# Patient Record
Sex: FEMALE | Race: WHITE | NOT HISPANIC OR LATINO | Employment: OTHER | URBAN - METROPOLITAN AREA
[De-identification: names, ages, dates, MRNs, and addresses within clinical notes are randomized per-mention and may not be internally consistent; named-entity substitution may affect disease eponyms.]

---

## 2019-10-07 ENCOUNTER — OFFICE VISIT (OUTPATIENT)
Dept: OBGYN CLINIC | Facility: CLINIC | Age: 79
End: 2019-10-07
Payer: MEDICARE

## 2019-10-07 ENCOUNTER — APPOINTMENT (OUTPATIENT)
Dept: RADIOLOGY | Facility: CLINIC | Age: 79
End: 2019-10-07
Payer: MEDICARE

## 2019-10-07 VITALS
HEIGHT: 66 IN | HEART RATE: 61 BPM | DIASTOLIC BLOOD PRESSURE: 76 MMHG | BODY MASS INDEX: 29.89 KG/M2 | SYSTOLIC BLOOD PRESSURE: 166 MMHG | WEIGHT: 186 LBS

## 2019-10-07 DIAGNOSIS — M18.12 ARTHRITIS OF CARPOMETACARPAL (CMC) JOINT OF LEFT THUMB: ICD-10-CM

## 2019-10-07 DIAGNOSIS — R20.2 LEFT HAND PARESTHESIA: ICD-10-CM

## 2019-10-07 DIAGNOSIS — M18.12 ARTHRITIS OF CARPOMETACARPAL (CMC) JOINT OF LEFT THUMB: Primary | ICD-10-CM

## 2019-10-07 PROCEDURE — 99203 OFFICE O/P NEW LOW 30 MIN: CPT | Performed by: ORTHOPAEDIC SURGERY

## 2019-10-07 PROCEDURE — 20600 DRAIN/INJ JOINT/BURSA W/O US: CPT | Performed by: ORTHOPAEDIC SURGERY

## 2019-10-07 PROCEDURE — 73140 X-RAY EXAM OF FINGER(S): CPT

## 2019-10-07 RX ORDER — TRIAMCINOLONE ACETONIDE 40 MG/ML
20 INJECTION, SUSPENSION INTRA-ARTICULAR; INTRAMUSCULAR
Status: COMPLETED | OUTPATIENT
Start: 2019-10-07 | End: 2019-10-07

## 2019-10-07 RX ORDER — HYDROCHLOROTHIAZIDE 12.5 MG/1
12.5 CAPSULE, GELATIN COATED ORAL DAILY
COMMUNITY

## 2019-10-07 RX ORDER — LIDOCAINE HYDROCHLORIDE 5 MG/ML
0.5 INJECTION, SOLUTION INFILTRATION; PERINEURAL
Status: COMPLETED | OUTPATIENT
Start: 2019-10-07 | End: 2019-10-07

## 2019-10-07 RX ADMIN — TRIAMCINOLONE ACETONIDE 20 MG: 40 INJECTION, SUSPENSION INTRA-ARTICULAR; INTRAMUSCULAR at 15:15

## 2019-10-07 RX ADMIN — LIDOCAINE HYDROCHLORIDE 0.5 ML: 5 INJECTION, SOLUTION INFILTRATION; PERINEURAL at 15:15

## 2019-10-07 NOTE — PROGRESS NOTES
Assessment/Plan:  1  Arthritis of carpometacarpal (CMC) joint of left thumb  XR thumb left first digit-min 2v    Small joint arthrocentesis: L thumb CMC    CANCELED: EMG 1 Limb   2  Left hand paresthesia  EMG 1 Limb       Scribe Attestation    I,:   Pastora Padilla MA am acting as a scribe while in the presence of the attending physician :        I,:   Daksha Foster DO personally performed the services described in this documentation    as scribed in my presence :              I discussed with Priya today that her signs and symptoms are consistent with left thumb CMC arthritis  She is tender to palpation over the [facility] joint  She does have signs consistent with left carpal tunnel well however all testing is negative on exam  An EMG was ordered for this today  Treatment options for left thumb CMC arthritis was discussed in the form of a steroid injection  She was agreeable to this and this was performed in the office today without any complications  She was fitted and provided with a cock-up wrist brace  She will follow in 3 months for repeat evaluation  Subjective:   Butch Chopra is a 78 y o  female who presents to the office today for evaluation of left hand pain She notes pain intermittently globally about her left hand  She states this has been ongoing for many years  Patient states she was evaluated by Dr Kisha Gill in the past and did undergo a CSI however, she does not recall what the CSI was for  She notes numbness and tingling to her entire hand  She states this is increased when she drives  Review of Systems   Constitutional: Negative for chills and fever  HENT: Negative for drooling and sneezing  Eyes: Negative for redness  Respiratory: Negative for cough and wheezing  Gastrointestinal: Negative for nausea and vomiting  Musculoskeletal: Positive for arthralgias  Negative for joint swelling and myalgias  Neurological: Negative for weakness and numbness     Psychiatric/Behavioral: Negative for behavioral problems  The patient is not nervous/anxious  Past Medical History:   Diagnosis Date    Hypertension        Past Surgical History:   Procedure Laterality Date    BLADDER SURGERY      CATARACT EXTRACTION Bilateral     RETINAL DETACHMENT SURGERY Bilateral        Family History   Problem Relation Age of Onset    No Known Problems Mother     No Known Problems Father     No Known Problems Sister     No Known Problems Brother     No Known Problems Maternal Aunt     No Known Problems Maternal Uncle     No Known Problems Paternal Aunt     No Known Problems Paternal Uncle     No Known Problems Maternal Grandmother     No Known Problems Maternal Grandfather     No Known Problems Paternal Grandmother     No Known Problems Paternal Grandfather        Social History     Occupational History    Not on file   Tobacco Use    Smoking status: Never Smoker    Smokeless tobacco: Never Used   Substance and Sexual Activity    Alcohol use: Never     Frequency: Never    Drug use: Never    Sexual activity: Not on file         Current Outpatient Medications:     hydrochlorothiazide (MICROZIDE) 12 5 mg capsule, Take 12 5 mg by mouth daily, Disp: , Rfl:     No Known Allergies    Objective:  Vitals:    10/07/19 1420   BP: 166/76   Pulse: 61       Ortho Exam     Left hand    - tinel's  - mike's  NTTP 1st dorsal compartment   Full fist  TTP thumb CMC  FDS FDP ext intact  Compartments soft  Brisk capillary refill  S/m intact median, radial, and ulnar nerve    Physical Exam   Constitutional: She is oriented to person, place, and time  She appears well-developed and well-nourished  HENT:   Head: Normocephalic and atraumatic  Eyes: Conjunctivae are normal  Right eye exhibits no discharge  Left eye exhibits no discharge  Neck: Normal range of motion  Neck supple  Cardiovascular: Normal rate and intact distal pulses  Pulmonary/Chest: Effort normal  No respiratory distress  Musculoskeletal:   As noted in HPI   Neurological: She is alert and oriented to person, place, and time  Skin: Skin is warm and dry  Psychiatric: She has a normal mood and affect  Her behavior is normal  Judgment and thought content normal      Small joint arthrocentesis: L thumb CMC  Date/Time: 10/7/2019 3:15 PM  Consent given by: patient  Site marked: site marked  Timeout: Immediately prior to procedure a time out was called to verify the correct patient, procedure, equipment, support staff and site/side marked as required   Supporting Documentation  Indications: joint swelling   Procedure Details  Location: thumb - L thumb CMC  Preparation: Patient was prepped and draped in the usual sterile fashion  Needle size: 27 G  Ultrasound guidance: no  Medications administered: 0 5 mL lidocaine 0 5 %; 20 mg triamcinolone acetonide 40 mg/mL    Patient tolerance: patient tolerated the procedure well with no immediate complications  Dressing:  Sterile dressing applied        I have personally reviewed pertinent films in PACS and my interpretation is as follows:X-ray left thumb performed in the office today demonstrates thumb CMC arthritis

## 2019-12-10 ENCOUNTER — APPOINTMENT (OUTPATIENT)
Dept: RADIOLOGY | Facility: CLINIC | Age: 79
End: 2019-12-10
Payer: MEDICARE

## 2019-12-10 ENCOUNTER — TRANSCRIBE ORDERS (OUTPATIENT)
Dept: RADIOLOGY | Facility: CLINIC | Age: 79
End: 2019-12-10

## 2019-12-10 DIAGNOSIS — M79.661 BILATERAL CALF PAIN: ICD-10-CM

## 2019-12-10 DIAGNOSIS — M79.662 BILATERAL CALF PAIN: Primary | ICD-10-CM

## 2019-12-10 DIAGNOSIS — M79.662 BILATERAL CALF PAIN: ICD-10-CM

## 2019-12-10 DIAGNOSIS — M79.661 BILATERAL CALF PAIN: Primary | ICD-10-CM

## 2019-12-10 PROCEDURE — 73590 X-RAY EXAM OF LOWER LEG: CPT

## 2020-01-24 ENCOUNTER — HOSPITAL ENCOUNTER (OUTPATIENT)
Dept: NEUROLOGY | Facility: HOSPITAL | Age: 80
Discharge: HOME | End: 2020-01-24
Attending: ORTHOPAEDIC SURGERY
Payer: MEDICARE

## 2020-01-24 DIAGNOSIS — R20.2 LEFT HAND PARESTHESIA: ICD-10-CM

## 2020-01-24 PROCEDURE — 95885 MUSC TST DONE W/NERV TST LIM: CPT | Performed by: PSYCHIATRY & NEUROLOGY

## 2020-01-24 PROCEDURE — 95909 NRV CNDJ TST 5-6 STUDIES: CPT | Performed by: PSYCHIATRY & NEUROLOGY

## 2020-01-30 ENCOUNTER — OFFICE VISIT (OUTPATIENT)
Dept: OBGYN CLINIC | Facility: CLINIC | Age: 80
End: 2020-01-30
Payer: MEDICARE

## 2020-01-30 VITALS — HEIGHT: 66 IN | BODY MASS INDEX: 29.89 KG/M2 | WEIGHT: 186 LBS

## 2020-01-30 DIAGNOSIS — M18.12 ARTHRITIS OF CARPOMETACARPAL (CMC) JOINT OF LEFT THUMB: Primary | ICD-10-CM

## 2020-01-30 DIAGNOSIS — G56.02 CARPAL TUNNEL SYNDROME ON LEFT: ICD-10-CM

## 2020-01-30 PROCEDURE — 99213 OFFICE O/P EST LOW 20 MIN: CPT | Performed by: ORTHOPAEDIC SURGERY

## 2020-01-30 PROCEDURE — 20600 DRAIN/INJ JOINT/BURSA W/O US: CPT | Performed by: ORTHOPAEDIC SURGERY

## 2020-01-30 RX ORDER — LIDOCAINE HYDROCHLORIDE 5 MG/ML
0.5 INJECTION, SOLUTION INFILTRATION; PERINEURAL
Status: COMPLETED | OUTPATIENT
Start: 2020-01-30 | End: 2020-01-30

## 2020-01-30 RX ORDER — TRIAMCINOLONE ACETONIDE 40 MG/ML
20 INJECTION, SUSPENSION INTRA-ARTICULAR; INTRAMUSCULAR
Status: COMPLETED | OUTPATIENT
Start: 2020-01-30 | End: 2020-01-30

## 2020-01-30 RX ADMIN — TRIAMCINOLONE ACETONIDE 20 MG: 40 INJECTION, SUSPENSION INTRA-ARTICULAR; INTRAMUSCULAR at 10:13

## 2020-01-30 RX ADMIN — LIDOCAINE HYDROCHLORIDE 0.5 ML: 5 INJECTION, SOLUTION INFILTRATION; PERINEURAL at 10:13

## 2020-01-30 NOTE — PROGRESS NOTES
Assessment/Plan:  1  Arthritis of carpometacarpal (CMC) joint of left thumb  Small joint arthrocentesis: L thumb CMC   2  Carpal tunnel syndrome on left         Scribe Attestation    I,:   Pastora Padilla MA am acting as a scribe while in the presence of the attending physician :        I,:   Olga Alvarado DO personally performed the services described in this documentation    as scribed in my presence :              The EMG results were discussed which does demonstrate left carpal tunnel with muscle loss  This is not bothersome for her at this time and we will continue to monitor this  Her main compliant is due to left thumb CMC arthritis  She is tender to palpation over the thumb CMC  Treatment options were discussed in the form of a repeat steroid injection  She was agreeable to this and this was performed in the office today without any complications  She will follow up in 3 months for repeat evaluation  Subjective:   Geraldine Gomez is a 78 y o  female who presents to the office today for follow up evaluation left hand numbness and tingling and left thumb CMC arthritis  Patient states her numbness and tingling has improved  She states she has been compliant with the use of the cock-up wrist brace  She notes ongoing pain to the left thumb CMC joint  She states this is increased with pinch and   An EMG was ordered at her last visit to evaluate for carpal tunnel  Review of Systems   Constitutional: Negative for chills and fever  HENT: Negative for drooling and sneezing  Eyes: Negative for redness  Respiratory: Negative for cough and wheezing  Gastrointestinal: Negative for nausea and vomiting  Musculoskeletal: Negative for arthralgias, joint swelling and myalgias  Neurological: Negative for weakness and numbness  Psychiatric/Behavioral: Negative for behavioral problems  The patient is not nervous/anxious            Past Medical History:   Diagnosis Date    Hypertension        Past Surgical History:   Procedure Laterality Date    BLADDER SURGERY      CATARACT EXTRACTION Bilateral     RETINAL DETACHMENT SURGERY Bilateral        Family History   Problem Relation Age of Onset    No Known Problems Mother     No Known Problems Father     No Known Problems Sister     No Known Problems Brother     No Known Problems Maternal Aunt     No Known Problems Maternal Uncle     No Known Problems Paternal Aunt     No Known Problems Paternal Uncle     No Known Problems Maternal Grandmother     No Known Problems Maternal Grandfather     No Known Problems Paternal Grandmother     No Known Problems Paternal Grandfather        Social History     Occupational History    Not on file   Tobacco Use    Smoking status: Never Smoker    Smokeless tobacco: Never Used   Substance and Sexual Activity    Alcohol use: Never     Frequency: Never    Drug use: Never    Sexual activity: Not on file         Current Outpatient Medications:     hydrochlorothiazide (MICROZIDE) 12 5 mg capsule, Take 12 5 mg by mouth daily, Disp: , Rfl:     No Known Allergies    Objective:  Vitals:       Ortho Exam     Left hand    - tinel's  - mike's  TTP thumb CMC   Compartments soft  Brisk capillary refill  S/m intact median, radial, and ulnar nerve     Physical Exam   Constitutional: She is oriented to person, place, and time  She appears well-developed and well-nourished  HENT:   Head: Normocephalic and atraumatic  Eyes: Conjunctivae are normal  Right eye exhibits no discharge  Left eye exhibits no discharge  Neck: Normal range of motion  Neck supple  Cardiovascular: Normal rate and intact distal pulses  Pulmonary/Chest: Effort normal  No respiratory distress  Musculoskeletal:   As noted in HPI   Neurological: She is alert and oriented to person, place, and time  Skin: Skin is warm and dry  Psychiatric: She has a normal mood and affect   Her behavior is normal  Judgment and thought content normal    Small joint arthrocentesis: L thumb CMC  Date/Time: 1/30/2020 10:13 AM  Consent given by: patient  Site marked: site marked  Timeout: Immediately prior to procedure a time out was called to verify the correct patient, procedure, equipment, support staff and site/side marked as required   Supporting Documentation  Indications: pain   Procedure Details  Location: thumb - L thumb CMC  Preparation: Patient was prepped and draped in the usual sterile fashion  Needle size: 27 G  Ultrasound guidance: no  Medications administered: 0 5 mL lidocaine 0 5 %; 20 mg triamcinolone acetonide 40 mg/mL    Patient tolerance: patient tolerated the procedure well with no immediate complications  Dressing:  Sterile dressing applied      I have personally reviewed pertinent films in PACS and my interpretation is as follows:EMG LUE performed on 1/24/20 demonstrates left carpal tunnel

## 2020-09-17 ENCOUNTER — OFFICE VISIT (OUTPATIENT)
Dept: OBGYN CLINIC | Facility: CLINIC | Age: 80
End: 2020-09-17
Payer: MEDICARE

## 2020-09-17 VITALS
BODY MASS INDEX: 29.23 KG/M2 | TEMPERATURE: 99.7 F | HEART RATE: 69 BPM | SYSTOLIC BLOOD PRESSURE: 168 MMHG | WEIGHT: 181.1 LBS | DIASTOLIC BLOOD PRESSURE: 84 MMHG

## 2020-09-17 DIAGNOSIS — M18.12 ARTHRITIS OF CARPOMETACARPAL (CMC) JOINT OF LEFT THUMB: Primary | ICD-10-CM

## 2020-09-17 PROCEDURE — 99213 OFFICE O/P EST LOW 20 MIN: CPT | Performed by: ORTHOPAEDIC SURGERY

## 2020-09-17 PROCEDURE — 20600 DRAIN/INJ JOINT/BURSA W/O US: CPT | Performed by: ORTHOPAEDIC SURGERY

## 2020-09-17 RX ORDER — LIDOCAINE HYDROCHLORIDE 5 MG/ML
0.5 INJECTION, SOLUTION INFILTRATION; PERINEURAL
Status: COMPLETED | OUTPATIENT
Start: 2020-09-17 | End: 2020-09-17

## 2020-09-17 RX ORDER — TRIAMCINOLONE ACETONIDE 40 MG/ML
20 INJECTION, SUSPENSION INTRA-ARTICULAR; INTRAMUSCULAR
Status: COMPLETED | OUTPATIENT
Start: 2020-09-17 | End: 2020-09-17

## 2020-09-17 RX ADMIN — TRIAMCINOLONE ACETONIDE 20 MG: 40 INJECTION, SUSPENSION INTRA-ARTICULAR; INTRAMUSCULAR at 11:58

## 2020-09-17 RX ADMIN — LIDOCAINE HYDROCHLORIDE 0.5 ML: 5 INJECTION, SOLUTION INFILTRATION; PERINEURAL at 11:58

## 2020-09-17 NOTE — PROGRESS NOTES
Assessment/Plan:  1  Arthritis of carpometacarpal (CMC) joint of left thumb  Small joint arthrocentesis: L thumb CMC       Scribe Attestation    I,:   Pastora Padilla MA am acting as a scribe while in the presence of the attending physician :        I,:   Petey Hill DO personally performed the services described in this documentation    as scribed in my presence :              Priya did see good relief from previous steroid injection  Treatment options were discussed in the form of a repeat injection  She was agreeable to this  She consented and underwent a left thumb CMC injection in the office today without any complications  She was advised to continue with the use of the brace as needed  She may follow up with me as needed  Subjective:   Marlys Jung is a 78 y o  female who presents to the office today for follow up evaluation left thumb CMC arthritis  Patient underwent a left thumb CMC injection in January which she states provided her with good relief  She states her pain is beginning to return with activities  She notes pain to the base of her left thumb  She states this is increased with pinch and   Patient is requesting a repeat steroid injection in the office today  She has been using the brace at night which does provide her with relief  Review of Systems   Constitutional: Negative for chills and fever  HENT: Negative for drooling and sneezing  Eyes: Negative for redness  Respiratory: Negative for cough and wheezing  Gastrointestinal: Negative for nausea and vomiting  Musculoskeletal: Positive for arthralgias  Negative for joint swelling and myalgias  Neurological: Negative for weakness and numbness  Psychiatric/Behavioral: Negative for behavioral problems  The patient is not nervous/anxious            Past Medical History:   Diagnosis Date    Hypertension        Past Surgical History:   Procedure Laterality Date    BLADDER SURGERY      CATARACT EXTRACTION Bilateral  RETINAL DETACHMENT SURGERY Bilateral        Family History   Problem Relation Age of Onset    No Known Problems Mother     No Known Problems Father     No Known Problems Sister     No Known Problems Brother     No Known Problems Maternal Aunt     No Known Problems Maternal Uncle     No Known Problems Paternal Aunt     No Known Problems Paternal Uncle     No Known Problems Maternal Grandmother     No Known Problems Maternal Grandfather     No Known Problems Paternal Grandmother     No Known Problems Paternal Grandfather        Social History     Occupational History    Not on file   Tobacco Use    Smoking status: Never Smoker    Smokeless tobacco: Never Used   Substance and Sexual Activity    Alcohol use: Never     Frequency: Never    Drug use: Never    Sexual activity: Not on file         Current Outpatient Medications:     hydrochlorothiazide (MICROZIDE) 12 5 mg capsule, Take 12 5 mg by mouth daily, Disp: , Rfl:     No Known Allergies    Objective:  Vitals:    09/17/20 1151   BP: 168/84   Pulse: 69   Temp: 99 7 °F (37 6 °C)       Ortho Exam     Left thumb    TTP thumb CMC  EPL FPL intact  Compartments soft  Brisk capillary refill  S/m intact median, radial, and ulnar nerve     Physical Exam  Constitutional:       Appearance: She is well-developed  HENT:      Head: Normocephalic and atraumatic  Eyes:      General:         Right eye: No discharge  Left eye: No discharge  Conjunctiva/sclera: Conjunctivae normal    Neck:      Musculoskeletal: Normal range of motion and neck supple  Cardiovascular:      Rate and Rhythm: Normal rate  Pulmonary:      Effort: Pulmonary effort is normal  No respiratory distress  Musculoskeletal:      Comments: As noted in HPI   Skin:     General: Skin is warm and dry  Neurological:      Mental Status: She is alert and oriented to person, place, and time  Psychiatric:         Behavior: Behavior normal          Thought Content:  Thought content normal          Judgment: Judgment normal      Small joint arthrocentesis: L thumb CMC  Date/Time: 9/17/2020 11:58 AM  Consent given by: patient  Site marked: site marked  Timeout: Immediately prior to procedure a time out was called to verify the correct patient, procedure, equipment, support staff and site/side marked as required   Supporting Documentation  Indications: pain   Procedure Details  Location: thumb - L thumb CMC  Preparation: Patient was prepped and draped in the usual sterile fashion  Needle size: 27 G  Ultrasound guidance: no  Medications administered: 0 5 mL lidocaine 0 5 %; 20 mg triamcinolone acetonide 40 mg/mL    Patient tolerance: patient tolerated the procedure well with no immediate complications  Dressing:  Sterile dressing applied

## 2021-01-22 ENCOUNTER — OFFICE VISIT (OUTPATIENT)
Dept: OBGYN CLINIC | Facility: CLINIC | Age: 81
End: 2021-01-22
Payer: MEDICARE

## 2021-01-22 VITALS
BODY MASS INDEX: 29.81 KG/M2 | WEIGHT: 185.5 LBS | SYSTOLIC BLOOD PRESSURE: 136 MMHG | HEIGHT: 66 IN | DIASTOLIC BLOOD PRESSURE: 69 MMHG | HEART RATE: 73 BPM

## 2021-01-22 DIAGNOSIS — M18.12 ARTHRITIS OF CARPOMETACARPAL (CMC) JOINT OF LEFT THUMB: Primary | ICD-10-CM

## 2021-01-22 DIAGNOSIS — M65.342 TRIGGER RING FINGER OF LEFT HAND: ICD-10-CM

## 2021-01-22 PROCEDURE — 20600 DRAIN/INJ JOINT/BURSA W/O US: CPT | Performed by: ORTHOPAEDIC SURGERY

## 2021-01-22 PROCEDURE — 99213 OFFICE O/P EST LOW 20 MIN: CPT | Performed by: ORTHOPAEDIC SURGERY

## 2021-01-22 RX ORDER — LIDOCAINE HYDROCHLORIDE 5 MG/ML
0.5 INJECTION, SOLUTION INFILTRATION; PERINEURAL
Status: COMPLETED | OUTPATIENT
Start: 2021-01-22 | End: 2021-01-22

## 2021-01-22 RX ORDER — TRIAMCINOLONE ACETONIDE 40 MG/ML
20 INJECTION, SUSPENSION INTRA-ARTICULAR; INTRAMUSCULAR
Status: COMPLETED | OUTPATIENT
Start: 2021-01-22 | End: 2021-01-22

## 2021-01-22 RX ADMIN — TRIAMCINOLONE ACETONIDE 20 MG: 40 INJECTION, SUSPENSION INTRA-ARTICULAR; INTRAMUSCULAR at 10:58

## 2021-01-22 RX ADMIN — LIDOCAINE HYDROCHLORIDE 0.5 ML: 5 INJECTION, SOLUTION INFILTRATION; PERINEURAL at 10:58

## 2021-01-22 NOTE — PROGRESS NOTES
Assessment/Plan:  1  Arthritis of carpometacarpal (CMC) joint of left thumb  Small joint arthrocentesis: L thumb CMC   2  Trigger ring finger of left hand         Scribe Attestation    I,:  Pastora Padilla MA am acting as a scribe while in the presence of the attending physician :       I,:  Eusebio Urban DO personally performed the services described in this documentation    as scribed in my presence  :             58-year-old female with left thumb CMC arthritis and early left ring trigger finger  Treatment options were discussed in the form of steroid injections  She was agreeable to this  I explained to her that I do not want to do a steroid injection for the ring finger trigger finger due to her wedding band and finger swelling  Patient advised to evaluate the hand  A prescription was also provided for Voltaren Gel  She can use this 3 x's a day  She consented and underwent a left thumb CMC injection in the office today without any complications  Patient advised to get the swelling under control in her ring finger and remove the ring then we can consider a steroid injection for her trigger finger  She may follow up with me as needed  Subjective:   Dakota Lyle is a 78 y o  female who presents to the office today for follow up evaluation left thumb CMC arthritis  Patient notes pain to the base of her thumb  She states this is increased with pinch and   She has been undergoing periodic steroid injections which provides her with appx 3 months worth of relief  She also notes pain and swelling about her left ring finger  She noted increased pain when trying to open a jar  Patient states her ring finger is swollen and she is unable to get her wedding ring off  Review of Systems   Constitutional: Negative for chills and fever  HENT: Negative for drooling and sneezing  Eyes: Negative for redness  Respiratory: Negative for cough and wheezing  Gastrointestinal: Negative for nausea and vomiting  Musculoskeletal: Negative for arthralgias, joint swelling and myalgias  Neurological: Negative for weakness and numbness  Psychiatric/Behavioral: Negative for behavioral problems  The patient is not nervous/anxious  Past Medical History:   Diagnosis Date    Hypertension        Past Surgical History:   Procedure Laterality Date    BLADDER SURGERY      CATARACT EXTRACTION Bilateral     RETINAL DETACHMENT SURGERY Bilateral        Family History   Problem Relation Age of Onset    No Known Problems Mother     No Known Problems Father     No Known Problems Sister     No Known Problems Brother     No Known Problems Maternal Aunt     No Known Problems Maternal Uncle     No Known Problems Paternal Aunt     No Known Problems Paternal Uncle     No Known Problems Maternal Grandmother     No Known Problems Maternal Grandfather     No Known Problems Paternal Grandmother     No Known Problems Paternal Grandfather        Social History     Occupational History    Not on file   Tobacco Use    Smoking status: Never Smoker    Smokeless tobacco: Never Used   Substance and Sexual Activity    Alcohol use: Never     Frequency: Never    Drug use: Never    Sexual activity: Not on file         Current Outpatient Medications:     Cholecalciferol (Vitamin D3) 1 25 MG (84354 UT) TABS, Take by mouth, Disp: , Rfl:     hydrochlorothiazide (MICROZIDE) 12 5 mg capsule, Take 12 5 mg by mouth daily, Disp: , Rfl:     No Known Allergies    Objective:  Vitals:    01/22/21 1046   BP: 136/69   Pulse: 73       Ortho Exam     Left thumb    TTP thumb CMC  TTP A1 pulley ring finger   Compartments soft  Brisk capillary refill  S/m intact median, radial, and ulnar nerve     Physical Exam  Constitutional:       Appearance: She is well-developed  HENT:      Head: Normocephalic and atraumatic  Eyes:      General:         Right eye: No discharge  Left eye: No discharge        Conjunctiva/sclera: Conjunctivae normal  Neck:      Musculoskeletal: Normal range of motion and neck supple  Cardiovascular:      Rate and Rhythm: Normal rate  Pulmonary:      Effort: Pulmonary effort is normal  No respiratory distress  Musculoskeletal:      Comments: As noted in HPI   Skin:     General: Skin is warm and dry  Neurological:      Mental Status: She is alert and oriented to person, place, and time  Psychiatric:         Behavior: Behavior normal          Thought Content: Thought content normal          Judgment: Judgment normal      Small joint arthrocentesis: L thumb CMC  [place] Protocol:  Consent: Verbal consent obtained    Consent given by: patient  Patient identity confirmed: verbally with patient    Supporting Documentation  Indications: pain   Procedure Details  Location: thumb - L thumb CMC  Preparation: Patient was prepped and draped in the usual sterile fashion  Needle size: 27 G  Ultrasound guidance: no  Medications administered: 0 5 mL lidocaine 0 5 %; 20 mg triamcinolone acetonide 40 mg/mL    Patient tolerance: patient tolerated the procedure well with no immediate complications  Dressing:  Sterile dressing applied

## 2021-01-29 ENCOUNTER — OFFICE VISIT (OUTPATIENT)
Dept: OBGYN CLINIC | Facility: CLINIC | Age: 81
End: 2021-01-29
Payer: MEDICARE

## 2021-01-29 VITALS
BODY MASS INDEX: 29.73 KG/M2 | HEIGHT: 66 IN | HEART RATE: 76 BPM | WEIGHT: 185 LBS | DIASTOLIC BLOOD PRESSURE: 79 MMHG | SYSTOLIC BLOOD PRESSURE: 144 MMHG

## 2021-01-29 DIAGNOSIS — M65.342 TRIGGER RING FINGER OF LEFT HAND: Primary | ICD-10-CM

## 2021-01-29 PROCEDURE — 20550 NJX 1 TENDON SHEATH/LIGAMENT: CPT | Performed by: ORTHOPAEDIC SURGERY

## 2021-01-29 PROCEDURE — 99213 OFFICE O/P EST LOW 20 MIN: CPT | Performed by: ORTHOPAEDIC SURGERY

## 2021-01-29 RX ORDER — TRIAMCINOLONE ACETONIDE 40 MG/ML
20 INJECTION, SUSPENSION INTRA-ARTICULAR; INTRAMUSCULAR
Status: COMPLETED | OUTPATIENT
Start: 2021-01-29 | End: 2021-01-29

## 2021-01-29 RX ORDER — LIDOCAINE HYDROCHLORIDE 5 MG/ML
0.5 INJECTION, SOLUTION INFILTRATION; PERINEURAL
Status: COMPLETED | OUTPATIENT
Start: 2021-01-29 | End: 2021-01-29

## 2021-01-29 RX ADMIN — TRIAMCINOLONE ACETONIDE 20 MG: 40 INJECTION, SUSPENSION INTRA-ARTICULAR; INTRAMUSCULAR at 10:45

## 2021-01-29 RX ADMIN — LIDOCAINE HYDROCHLORIDE 0.5 ML: 5 INJECTION, SOLUTION INFILTRATION; PERINEURAL at 10:45

## 2021-01-29 NOTE — PROGRESS NOTES
Assessment/Plan:  1  Trigger ring finger of left hand  Hand/upper extremity injection: L ring A1       Scribe Attestation    I,:  Pastora Padilla MA am acting as a scribe while in the presence of the attending physician :       I,:  Na Abarca DO personally performed the services described in this documentation    as scribed in my presence  :             25-year-old female with left ring finger trigger finger  There is obvious triggering on exam  She is tender to palpation over the A1 pulley  Treatment options were discussed in the form of a steroid injection  She was agreeable to this  She consented and underwent a left ring finger trigger finger injection in the office today without any complications  She will follow up in 3 months for repeat evaluation  Subjective:   Juliette Gama is a 78 y o  female who presents to the office today for follow up evaluation left ring finger trigger finger  Patient states she was able to remove her wedding band yesterday  She states she did not  the Voltaren Gel due to cost  She also states with her ADLs she wont be able to use a cream as her hands are always in water  Patient notes intermittent triggering to her left ring finger  Review of Systems   Constitutional: Negative for chills and fever  HENT: Negative for drooling and sneezing  Eyes: Negative for redness  Respiratory: Negative for cough and wheezing  Gastrointestinal: Negative for nausea and vomiting  Musculoskeletal: Negative for arthralgias, joint swelling and myalgias  Neurological: Negative for weakness and numbness  Psychiatric/Behavioral: Negative for behavioral problems  The patient is not nervous/anxious            Past Medical History:   Diagnosis Date    Hypertension        Past Surgical History:   Procedure Laterality Date    BLADDER SURGERY      CATARACT EXTRACTION Bilateral     RETINAL DETACHMENT SURGERY Bilateral        Family History   Problem Relation Age of Onset  No Known Problems Mother     No Known Problems Father     No Known Problems Sister     No Known Problems Brother     No Known Problems Maternal Aunt     No Known Problems Maternal Uncle     No Known Problems Paternal Aunt     No Known Problems Paternal Uncle     No Known Problems Maternal Grandmother     No Known Problems Maternal Grandfather     No Known Problems Paternal Grandmother     No Known Problems Paternal Grandfather        Social History     Occupational History    Not on file   Tobacco Use    Smoking status: Never Smoker    Smokeless tobacco: Never Used   Substance and Sexual Activity    Alcohol use: Never     Frequency: Never    Drug use: Never    Sexual activity: Not on file         Current Outpatient Medications:     Cholecalciferol (Vitamin D3) 1 25 MG (14054 UT) TABS, Take by mouth, Disp: , Rfl:     Diclofenac Sodium (VOLTAREN) 1 %, Apply 2 g topically 4 (four) times a day, Disp: 1 Tube, Rfl: 0    hydrochlorothiazide (MICROZIDE) 12 5 mg capsule, Take 12 5 mg by mouth daily, Disp: , Rfl:     No Known Allergies    Objective:  Vitals:    01/29/21 1037   BP: 144/79   Pulse: 76       Ortho Exam     Left ring finger    Obvious triggering  TTP A1 pulley   Compartments soft  Brisk capillary refill  S/m intact median, radial, and ulnar nerve     Physical Exam  Constitutional:       Appearance: She is well-developed  HENT:      Head: Normocephalic and atraumatic  Eyes:      General:         Right eye: No discharge  Left eye: No discharge  Conjunctiva/sclera: Conjunctivae normal    Neck:      Musculoskeletal: Normal range of motion and neck supple  Cardiovascular:      Rate and Rhythm: Normal rate  Pulmonary:      Effort: Pulmonary effort is normal  No respiratory distress  Musculoskeletal:      Comments: As noted in HPI   Skin:     General: Skin is warm and dry  Neurological:      Mental Status: She is alert and oriented to person, place, and time  Psychiatric:         Behavior: Behavior normal          Thought Content: Thought content normal          Judgment: Judgment normal      Hand/upper extremity injection: L ring A1  Universal Protocol:  Consent: Verbal consent obtained    Consent given by: patient  Patient identity confirmed: verbally with patient    Supporting Documentation  Indications: pain   Procedure Details  Condition:trigger finger Location: ring finger - L ring A1   Preparation: Patient was prepped and draped in the usual sterile fashion  Needle size: 27 G  Ultrasound guidance: no  Medications administered: 0 5 mL lidocaine 0 5 %; 20 mg triamcinolone acetonide 40 mg/mL    Patient tolerance: patient tolerated the procedure well with no immediate complications  Dressing:  Sterile dressing applied

## 2021-04-30 ENCOUNTER — OFFICE VISIT (OUTPATIENT)
Dept: OBGYN CLINIC | Facility: CLINIC | Age: 81
End: 2021-04-30
Payer: MEDICARE

## 2021-04-30 VITALS
HEART RATE: 80 BPM | WEIGHT: 182 LBS | SYSTOLIC BLOOD PRESSURE: 125 MMHG | HEIGHT: 66 IN | DIASTOLIC BLOOD PRESSURE: 71 MMHG | BODY MASS INDEX: 29.25 KG/M2

## 2021-04-30 DIAGNOSIS — M18.12 ARTHRITIS OF CARPOMETACARPAL (CMC) JOINT OF LEFT THUMB: Primary | ICD-10-CM

## 2021-04-30 DIAGNOSIS — M25.552 PAIN IN LEFT HIP: ICD-10-CM

## 2021-04-30 PROCEDURE — 20600 DRAIN/INJ JOINT/BURSA W/O US: CPT | Performed by: ORTHOPAEDIC SURGERY

## 2021-04-30 PROCEDURE — 99213 OFFICE O/P EST LOW 20 MIN: CPT | Performed by: ORTHOPAEDIC SURGERY

## 2021-04-30 RX ORDER — LIDOCAINE HYDROCHLORIDE 10 MG/ML
0.5 INJECTION, SOLUTION INFILTRATION; PERINEURAL
Status: COMPLETED | OUTPATIENT
Start: 2021-04-30 | End: 2021-04-30

## 2021-04-30 RX ORDER — TRIAMCINOLONE ACETONIDE 40 MG/ML
20 INJECTION, SUSPENSION INTRA-ARTICULAR; INTRAMUSCULAR
Status: COMPLETED | OUTPATIENT
Start: 2021-04-30 | End: 2021-04-30

## 2021-04-30 RX ADMIN — TRIAMCINOLONE ACETONIDE 20 MG: 40 INJECTION, SUSPENSION INTRA-ARTICULAR; INTRAMUSCULAR at 11:34

## 2021-04-30 RX ADMIN — LIDOCAINE HYDROCHLORIDE 0.5 ML: 10 INJECTION, SOLUTION INFILTRATION; PERINEURAL at 11:34

## 2021-04-30 NOTE — PROGRESS NOTES
Assessment/Plan:  1  Arthritis of carpometacarpal (CMC) joint of left thumb  Small joint arthrocentesis: L thumb CMC   2  Pain in left hip  Ambulatory referral to Orthopedic Surgery       Scribe Attestation    I,:  Pastora Padilla MA am acting as a scribe while in the presence of the attending physician :       I,:  Jil Marcum DO personally performed the services described in this documentation    as scribed in my presence  :             53-year-old female with left thumb CMC arthritis  Treatment options were discussed in the form of a repeat steroid injection  She was agreeable to this  She consented and underwent a left thumb CMC injection in the office today without any complications  We did discuss surgical intervention  She is interested in this however, needs to figure out timing  A referral was provided to my partner Dr Yee Jacome for evaluation of left hip pain  She will follow up in 3 months for repeat evaluation  Subjective:   Lazara Guzman is a 78 y o  female who presents to the office today for follow up evaluation of her left hand  Patient underwent a left ring finger trigger finger injection at her last visit which she states provided her with good relief  She denies any issues with the ring finger today  She denies any triggering  She notes pain the base of her left thumb  She notes increased pain with pinch and   Review of Systems   Constitutional: Negative for chills and fever  HENT: Negative for drooling and sneezing  Eyes: Negative for redness  Respiratory: Negative for cough and wheezing  Gastrointestinal: Negative for nausea and vomiting  Musculoskeletal: Negative for arthralgias, joint swelling and myalgias  Neurological: Negative for weakness and numbness  Psychiatric/Behavioral: Negative for behavioral problems  The patient is not nervous/anxious            Past Medical History:   Diagnosis Date    Hypertension        Past Surgical History:   Procedure Laterality Date    BLADDER SURGERY      CATARACT EXTRACTION Bilateral     RETINAL DETACHMENT SURGERY Bilateral        Family History   Problem Relation Age of Onset    No Known Problems Mother     No Known Problems Father     No Known Problems Sister     No Known Problems Brother     No Known Problems Maternal Aunt     No Known Problems Maternal Uncle     No Known Problems Paternal Aunt     No Known Problems Paternal Uncle     No Known Problems Maternal Grandmother     No Known Problems Maternal Grandfather     No Known Problems Paternal Grandmother     No Known Problems Paternal Grandfather        Social History     Occupational History    Not on file   Tobacco Use    Smoking status: Never Smoker    Smokeless tobacco: Never Used   Substance and Sexual Activity    Alcohol use: Never     Frequency: Never    Drug use: Never    Sexual activity: Not on file         Current Outpatient Medications:     Cholecalciferol (Vitamin D3) 1 25 MG (79887 UT) TABS, Take by mouth, Disp: , Rfl:     Diclofenac Sodium (VOLTAREN) 1 %, Apply 2 g topically 4 (four) times a day, Disp: 1 Tube, Rfl: 0    hydrochlorothiazide (MICROZIDE) 12 5 mg capsule, Take 12 5 mg by mouth daily, Disp: , Rfl:     No Known Allergies    Objective:  Vitals:    04/30/21 1028   BP: 125/71   Pulse: 80       Ortho Exam     Left hand    TTP thumb CMC  No triggering ring finger   Compartments soft  Brisk capillary refill  S/m intact median, radial, and ulnar nerve     Physical Exam  Constitutional:       Appearance: She is well-developed  HENT:      Head: Normocephalic and atraumatic  Eyes:      General:         Right eye: No discharge  Left eye: No discharge  Conjunctiva/sclera: Conjunctivae normal    Neck:      Musculoskeletal: Normal range of motion and neck supple  Cardiovascular:      Rate and Rhythm: Normal rate  Pulmonary:      Effort: Pulmonary effort is normal  No respiratory distress     Musculoskeletal:      Comments: As noted in HPI   Skin:     General: Skin is warm and dry  Neurological:      Mental Status: She is alert and oriented to person, place, and time  Psychiatric:         Behavior: Behavior normal          Thought Content: Thought content normal          Judgment: Judgment normal      Small joint arthrocentesis: L thumb CMC  [place] Protocol:  Consent: Verbal consent obtained    Consent given by: patient  Patient identity confirmed: verbally with patient    Supporting Documentation  Indications: pain   Procedure Details  Location: thumb - L thumb CMC  Preparation: Patient was prepped and draped in the usual sterile fashion  Needle size: 27 G  Ultrasound guidance: no  Medications administered: 0 5 mL lidocaine 1 %; 20 mg triamcinolone acetonide 40 mg/mL    Patient tolerance: patient tolerated the procedure well with no immediate complications  Dressing:  Sterile dressing applied

## 2021-05-07 ENCOUNTER — OFFICE VISIT (OUTPATIENT)
Dept: OBGYN CLINIC | Facility: CLINIC | Age: 81
End: 2021-05-07
Payer: MEDICARE

## 2021-05-07 ENCOUNTER — APPOINTMENT (OUTPATIENT)
Dept: RADIOLOGY | Facility: CLINIC | Age: 81
End: 2021-05-07
Payer: MEDICARE

## 2021-05-07 VITALS
WEIGHT: 181.2 LBS | SYSTOLIC BLOOD PRESSURE: 122 MMHG | HEIGHT: 66 IN | DIASTOLIC BLOOD PRESSURE: 74 MMHG | HEART RATE: 70 BPM | BODY MASS INDEX: 29.12 KG/M2

## 2021-05-07 DIAGNOSIS — M25.552 PAIN IN LEFT HIP: ICD-10-CM

## 2021-05-07 DIAGNOSIS — M70.62 GREATER TROCHANTERIC BURSITIS OF LEFT HIP: Primary | ICD-10-CM

## 2021-05-07 PROCEDURE — 99214 OFFICE O/P EST MOD 30 MIN: CPT | Performed by: ORTHOPAEDIC SURGERY

## 2021-05-07 PROCEDURE — 73502 X-RAY EXAM HIP UNI 2-3 VIEWS: CPT

## 2021-05-07 NOTE — PROGRESS NOTES
Assessment/Plan:  1  Greater trochanteric bursitis of left hip  Ambulatory referral to Physical Therapy   2  Pain in left hip  XR hip/pelv 2-3 vws left if performed    Ambulatory referral to Orthopedic Surgery    Ambulatory referral to Physical Therapy     Patient presenting with left greater trochanteric bursitis  We recommend patient start using Voltaren gel and start PT for bursitis  We will see patient in 2-3 months for follow-up  If she does not obtain any relief from these measures, she may need corticosteroid injection  Patient was understanding and agreement of this plan  Subjective: Left hip pain for the past 2 months    Patient ID: Lisa Marr is a 78 y o  female  Patient is here for left hip pain  She has had pain in her left hip for the past 2 months  It is worse when laying on her left side  She also notes pain with walking after laying on her left side  She states the pain is 5/10 at its worst  She denies any groin pain or any issues getting out of a car  She does have associated tingling sensation down the side of her leg  She has not tried anything for pain  Review of Systems   Constitutional: Positive for activity change  Negative for chills, diaphoresis, fatigue and fever  HENT: Negative  Eyes: Negative  Respiratory: Negative for cough, chest tightness, shortness of breath and wheezing  Cardiovascular: Negative for chest pain, palpitations and leg swelling  Gastrointestinal: Negative for abdominal distention, abdominal pain, blood in stool, constipation, diarrhea, nausea and vomiting  Endocrine: Negative  Genitourinary: Negative  Negative for difficulty urinating, dysuria, frequency, hematuria and urgency  Musculoskeletal: Positive for arthralgias (left lateral hip) and back pain (left low)  Negative for joint swelling, neck pain and neck stiffness  Skin: Negative for pallor and rash     Neurological: Negative for dizziness, facial asymmetry, weakness, light-headedness, numbness and headaches  Hematological: Negative  Psychiatric/Behavioral: Negative  All other systems reviewed and are negative  Past Medical History:   Diagnosis Date    Hypertension        Past Surgical History:   Procedure Laterality Date    BLADDER SURGERY      CATARACT EXTRACTION Bilateral     RETINAL DETACHMENT SURGERY Bilateral        Family History   Problem Relation Age of Onset    No Known Problems Mother     No Known Problems Father     No Known Problems Sister     No Known Problems Brother     No Known Problems Maternal Aunt     No Known Problems Maternal Uncle     No Known Problems Paternal Aunt     No Known Problems Paternal Uncle     No Known Problems Maternal Grandmother     No Known Problems Maternal Grandfather     No Known Problems Paternal Grandmother     No Known Problems Paternal Grandfather        Social History     Occupational History    Not on file   Tobacco Use    Smoking status: Never Smoker    Smokeless tobacco: Never Used   Substance and Sexual Activity    Alcohol use: Never     Frequency: Never    Drug use: Never    Sexual activity: Not on file         Current Outpatient Medications:     Cholecalciferol (Vitamin D3) 1 25 MG (29844 UT) TABS, Take by mouth, Disp: , Rfl:     hydrochlorothiazide (MICROZIDE) 12 5 mg capsule, Take 12 5 mg by mouth daily, Disp: , Rfl:     Diclofenac Sodium (VOLTAREN) 1 %, Apply 2 g topically 4 (four) times a day (Patient not taking: Reported on 5/7/2021), Disp: 1 Tube, Rfl: 0    No Known Allergies    Objective:  Vitals:    05/07/21 1038   BP: 122/74   Pulse: 70       Body mass index is 29 25 kg/m²  Left Hip Exam     Tenderness   The patient is experiencing tenderness in the greater trochanter  Range of Motion   The patient has normal left hip ROM  Muscle Strength   The patient has normal left hip strength       Tests   TEZ: negative    Other   Erythema: absent  Scars: absent  Sensation: normal  Pulse: present    Comments:  Negative Stinchfield testing  Physical Exam  Constitutional:       General: She is not in acute distress  Appearance: Normal appearance  She is not diaphoretic  HENT:      Head: Normocephalic and atraumatic  Eyes:      Extraocular Movements: Extraocular movements intact  Conjunctiva/sclera: Conjunctivae normal    Neck:      Musculoskeletal: Normal range of motion and neck supple  Cardiovascular:      Pulses: Normal pulses  Pulmonary:      Effort: Pulmonary effort is normal    Abdominal:      General: There is no distension  Palpations: Abdomen is soft  Tenderness: There is no abdominal tenderness  Skin:     General: Skin is warm and dry  Capillary Refill: Capillary refill takes less than 2 seconds  Neurological:      General: No focal deficit present  Mental Status: She is alert  Psychiatric:         Mood and Affect: Mood normal          Behavior: Behavior normal          I have personally reviewed pertinent films in PACS  X-rays obtained here in the office today of her left hip demonstrate age-appropriate degenerative change  No lytic or blastic lesion  No fracture

## 2021-05-11 ENCOUNTER — EVALUATION (OUTPATIENT)
Dept: PHYSICAL THERAPY | Facility: CLINIC | Age: 81
End: 2021-05-11
Payer: MEDICARE

## 2021-05-11 DIAGNOSIS — M25.552 PAIN IN LEFT HIP: ICD-10-CM

## 2021-05-11 DIAGNOSIS — M70.62 GREATER TROCHANTERIC BURSITIS OF LEFT HIP: ICD-10-CM

## 2021-05-11 PROCEDURE — 97110 THERAPEUTIC EXERCISES: CPT | Performed by: PHYSICAL THERAPIST

## 2021-05-11 PROCEDURE — 97161 PT EVAL LOW COMPLEX 20 MIN: CPT | Performed by: PHYSICAL THERAPIST

## 2021-05-11 NOTE — PROGRESS NOTES
PT Evaluation     Today's date: 2021  Patient name: Cait Fernandes  : 1940  MRN: [id number]  Referring provider: Polina Escalera MD  Dx:   Encounter Diagnosis     ICD-10-CM    1  Greater trochanteric bursitis of left hip  M70 62 Ambulatory referral to Physical Therapy   2  Pain in left hip  M25 552 Ambulatory referral to Physical Therapy                  Assessment  Assessment details: 2021: Cait Fernandes is a 78 y o  female who presents with pain, decreased strength, limited flexibility, postural dysfunction, limited balance and tenderness to direct pressure/palpation  Due to these impairments, patient has difficulty performing ADL's, transfers  Patient's clinical presentation is consistent with their referring diagnosis of Greater trochanteric bursitis of left hip and Pain in left hip  Lumbar mechanical assessment does not alter symptoms which are reported as pain only w/ L side lying  She reports pain reproduction with direct palpation to greater trochante and gluts  Plan: Ambulatory referral to Physical Therapy  Patient has been educated in home exercise program and plan of care  Patient would benefit from skilled physical therapy services to address their aforementioned functional limitations and progress towards prior level of function and independence with home exercise program      Impairments: abnormal gait, abnormal or restricted ROM, activity intolerance, impaired balance, impaired physical strength, lacks appropriate home exercise program, pain with function, weight-bearing intolerance and poor body mechanics  Other impairment: unable to lay on L side; limited balance  Functional limitations: unable to sleep through the night on L side  Symptom irritability: lowUnderstanding of Dx/Px/POC: good   Prognosis: good    Goals  Short Term Goals: to be completed in 4 weeks (21)  1  Pt will have decreased pain to 0-3/10 when in L side lie to inc comfort when sleeping    2  Pt to be able to perform SLS on R/L side for 15 sec w/EO to improve balance  3  Pt will be I with HEP  4  Pt to inc R/L hip abd to 5/5 to inc tolerance in SLS  Long Term Goals: to be completed in 12 weeks (8/3/21)  1  Pt will have 0/10 pain when in L SL to be able to sleep through the night on L side  2  Pt to inc FOTO score by at least 15 points to improve function with household work  3  Pt to inc hip ext strength B/L to 5/5 to improve walking tolerance  4  Pt to inc R/L hip IR ROM by 10 degrees to improve ability to perform self care  5  Pt to be able to perform SLS on R/L side for 30 sec w/EO to reduce risk of falls  Plan  Plan details:       Patient would benefit from: skilled physical therapy  Planned modality interventions: cryotherapy, thermotherapy: hydrocollator packs and unattended electrical stimulation  Planned therapy interventions: manual therapy, balance/weight bearing training, balance, neuromuscular re-education, patient education, postural training, stretching, strengthening, therapeutic activities, therapeutic exercise, flexibility, functional ROM exercises and home exercise program  Frequency: 2x week  Plan of Care beginning date: 2021  Plan of Care expiration date: 8/3/2021  Treatment plan discussed with: patient        Subjective Evaluation    History of Present Illness  Date of onset: 3/1/2021  Mechanism of injury: Subjective 21: Pt has PMH of vertigo and a fall 10 years ago  In beginning of 2021, pt had L ear pain and visited PCP, who told to her sleep on L side  About two weeks later, pts L hip started hurting  She began avoiding sleeping on L side ever since  Pt slipped and fell [place] a bowling [place] on L hip 10 years ago, but is not sure this is the cause because there was no pain until recently  Not a recurrent problem   Quality of life: good    Pain  Current pain ratin  At best pain ratin  At worst pain ratin  Pain location: lat hip into ant thigh    Quality: dull ache (gradually goes away once move out of that position)  Relieving factors: change in position  Exacerbated by: laying on L side  Progression: no change    Treatments  Previous treatment: physical therapy  Current treatment: physical therapy  Patient Goals  Patient/family treatment goals: Be able to sleep on L side  Patient goal: Dec pain to be able to lay on L side        Objective  A/PROM:  R  L     5/11/21 5/11/21  Hip flex sup  NT  NT  Hip abd sup  NT  NT  Hip ER prone  45P  45P   Hip IR prone  25P  25P    MMT:    R  L     5/11/21 5/11/21  Hip flex   5/5  5/5  Hip abd  5/5  4+/5  Hip ER   5/5  5/5  Hip IR   5/5  5/5  Hip add  NT  NT/5  Hip ext W/knee ext 3+/5  3+/5  Knee flex  5/5  5/5*  Knee ext  5/5  5/5    Lumbar screen: Lumbar AROM limitation     5/11/21   Flexion: Mod    Extension:  alexsandra   R Side glide:  mod   L Side glide: mod    Mechanical Assessment: pre-test symptoms include pain w/ L SL         5/11/21  Repeated Ext in Lying (REIL)x10 NE    Balance:   5/11/21 5/11/2021     EO  EC  Tandem R post 14 sec  NT  Tandem L post 3 sec  NT  SLS R   3 sec  NT  SLS L    3 sec  NT    Reflexes: patellar and achilles normal     Nerve Testing: L sciatic nerve tightness w/ 45 deg hip flexion and adduction    Function: sleep is disturbed when pt is SL on L and relieved once moved out of this position    Palpation: tender at L GT, sore at L iliac crest, sensitive at L ITB, no tenderness posteriorly    Flexibility: L hamstring tightness, L hip flexor end range tightness   R side hip flexor is minimally limited compared to L    Flowsheet Rows      Most Recent Value   PT/OT G-Codes   Current Score  68   Projected Score  75            Precautions: hx vertigo; hx falls; hx R wrist fx  SOC: 5/11/2021  FOTO: 5/11/2021  POC expiration: 8/3/2021  Daily Treatment Log  Date 5/11/21       Visit 1       Manual                                        Ther Ex        Supine piriformis stretch R/L 20"x3       Supine fig 4 ham stretch w/SOS R/L 20"x3       Bridges 1x10       Prone on elbows bend knees B/L 1x10       Standing abd stretch on wall R/L 20"x1               HEP administered       Ther Activity        Sit to stand        Side step w/ TB                                NM Re Ed        Tandem balance CGA        FT EC on foam CGA        Alt tap ups to step        Clamshells R SL                Modalities         CP to end        Estim w/ CP prn                          Access Code: [email or web address]  URL: [email or web address]  Date: 05/11/2021  Prepared by:  Jhon Venegas    Exercises  Supine Figure 4 Piriformis Stretch - 1 x daily - 7 x weekly - 4 reps - 20 hold  Supine Hamstring Stretch with Strap - 1 x daily - 7 x weekly - 4 reps - 20 hold  Supine Bridge - 1 x daily - 7 x weekly - 2 sets - 10 reps  Static Prone on Elbows - 1 x daily - 7 x weekly - 2 sets - 10 reps  ITB Stretch at Wall - 1 x daily - 7 x weekly - 3 reps - 20 hold

## 2021-05-13 ENCOUNTER — OFFICE VISIT (OUTPATIENT)
Dept: PHYSICAL THERAPY | Facility: CLINIC | Age: 81
End: 2021-05-13
Payer: MEDICARE

## 2021-05-13 DIAGNOSIS — M25.552 PAIN IN LEFT HIP: ICD-10-CM

## 2021-05-13 DIAGNOSIS — M70.62 GREATER TROCHANTERIC BURSITIS OF LEFT HIP: Primary | ICD-10-CM

## 2021-05-13 PROCEDURE — 97112 NEUROMUSCULAR REEDUCATION: CPT

## 2021-05-13 PROCEDURE — 97110 THERAPEUTIC EXERCISES: CPT

## 2021-05-13 NOTE — PROGRESS NOTES
Daily Note     Today's date: 2021  Patient name: Annel Guillen  : 1940  MRN: [id number]  Referring provider: Nanda Gonzalez MD  Dx:   Encounter Diagnosis     ICD-10-CM    1  Greater trochanteric bursitis of left hip  M70 62    2  Pain in left hip  M25 552                   Subjective: pt reports she only notices pain when she is sleeping on that side, uses Voltaren but has to really poke around trying to find the painful spot unless she has been laying on that side in bed  Objective: See treatment diary below      Assessment: Tolerated treatment well  Patient would benefit from continued PT Pt fatigued post session  No increases in hip pain with the additional exercise progressions  Impairments in static and dynamic balance  Would benefit from cont LE strengthening as tolerated  Plan: Continue per plan of care  Precautions: hx vertigo; hx falls; hx R wrist fx  SOC: 2021  FOTO: 2021  POC expiration: 8/3/2021  Daily Treatment Log  Date 21      Visit 1 2       Manual                                        Ther Ex  20'       Supine piriformis stretch R/L 20"x3 Figure 4 stretch 20"x3 R/L       ham stretch w/SOS R/L 20"x3 20"x3 R/L       Bridges 1x10 2x10       Prone on elbows B/L knee flexion  B/L 1x10 2x10       Standing ITB stretch on wall R/L 20"x1 R/L 20"x2               HEP administered       Ther Activity        Sit to stand  2x10 low mat       Side step w/ TB  RTB 2 laps at rail CS                               NM Re Ed  25'       Tandem balance CGA  20"x2 R/L      FT EC on foam CGA  EO 20"x1   EC 20"x2      Alt tap ups to step  4" 1x10 R/L   6" from foam 1x10 R/L        Clamshells R SL  2x10               Modalities         CP to end        Estim w/ CP prn                          Access Code: [email or web address]  URL: [email or web address]  Date: 2021  Prepared by:  Daniela Fraser    Exercises  Supine Figure 4 Piriformis Stretch - 1 x daily - 7 x weekly - 4 reps - 20 hold  Supine Hamstring Stretch with Strap - 1 x daily - 7 x weekly - 4 reps - 20 hold  Supine Bridge - 1 x daily - 7 x weekly - 2 sets - 10 reps  Static Prone on Elbows - 1 x daily - 7 x weekly - 2 sets - 10 reps  ITB Stretch at Wall - 1 x daily - 7 x weekly - 3 reps - 20 hold

## 2021-05-18 ENCOUNTER — OFFICE VISIT (OUTPATIENT)
Dept: PHYSICAL THERAPY | Facility: CLINIC | Age: 81
End: 2021-05-18
Payer: MEDICARE

## 2021-05-18 DIAGNOSIS — M25.552 PAIN IN LEFT HIP: ICD-10-CM

## 2021-05-18 DIAGNOSIS — M70.62 GREATER TROCHANTERIC BURSITIS OF LEFT HIP: Primary | ICD-10-CM

## 2021-05-18 PROCEDURE — 97112 NEUROMUSCULAR REEDUCATION: CPT | Performed by: PHYSICAL THERAPIST

## 2021-05-18 PROCEDURE — 97530 THERAPEUTIC ACTIVITIES: CPT | Performed by: PHYSICAL THERAPIST

## 2021-05-18 PROCEDURE — 97110 THERAPEUTIC EXERCISES: CPT | Performed by: PHYSICAL THERAPIST

## 2021-05-18 NOTE — PROGRESS NOTES
Daily Note     Today's date: 2021  Patient name: Lazara Guzman  : 1940  MRN: [id number]  Referring provider: Kathy Rodriguez MD  Dx:   Encounter Diagnosis     ICD-10-CM    1  Greater trochanteric bursitis of left hip  M70 62    2  Pain in left hip  M25 552                   Subjective: Pt reports doing her HEP at night before bed  Pt is unsure of whether or not PT is helping because she has not tried laying on her L side yet  Objective: See treatment diary below      Assessment: Tolerated treatment well  Pt was TTP at L GTrochanter w/pain that inc from 0-4/10, but no pain during exercises or stretching reported  Patient would benefit from continued PT to improve her balance and pain  Pt was told to try laying on L side to see if pain is still same amount or if it is improving  Plan: Continue per plan of care  Progress treatment as tolerated         Precautions: hx vertigo; hx falls; hx R wrist fx  SOC: 2021  FOTO: 2021  POC expiration: 8/3/2021  Daily Treatment Log  Date 21     Visit 1 2  3     Manual                                        Ther Ex  20'  15'     Supine piriformis stretch R/L 20"x3 Figure 4 stretch 20"x3 R/L  30"x2 R/L      ham stretch w/SOS R/L 20"x3 20"x3 R/L  20"x3 R/L     Bridges 1x10 2x10  2x10      Prone on elbows B/L knee flexion  B/L 1x10 2x10  1x10     Standing ITB stretch on wall R/L 20"x1 R/L 20"x2  20"x3 R/L     Supine hip flexor stretch off table   W/SOS 1x30''     HEP administered       Ther Activity   10'     Sit to stand  2x10 low mat  2x10 low mat     Side step w/ TB  RTB 2 laps at rail CS  RTB 2 laps at rail CS  BTB 2 laps at rail CS                             NM Re Ed  25'  20'     Tandem balance CGA  20"x2 R/L 20"x2 R/L     FT EC on foam CGA  EO 20"x1   EC 20"x2 EC 20"x2     Alt tap ups to step  4" 1x10 R/L   6" from foam 1x10 R/L   6" 1x10 R/L   6" 1x10 R/L shoes off on foam      Clamshells R SL  2x10  2x10             Modalities CP to end        Estim w/ CP prn                          Access Code: [email or web address]  URL: [email or web address]  [email or web address]  Date: 05/11/2021  Prepared by:  Yoav Page    Exercises  Supine Figure 4 Piriformis Stretch - 1 x daily - 7 x weekly - 4 reps - 20 hold  Supine Hamstring Stretch with Strap - 1 x daily - 7 x weekly - 4 reps - 20 hold  Supine Bridge - 1 x daily - 7 x weekly - 2 sets - 10 reps  Static Prone on Elbows - 1 x daily - 7 x weekly - 2 sets - 10 reps  ITB Stretch at Wall - 1 x daily - 7 x weekly - 3 reps - 20 hold

## 2021-05-20 ENCOUNTER — OFFICE VISIT (OUTPATIENT)
Dept: PHYSICAL THERAPY | Facility: CLINIC | Age: 81
End: 2021-05-20
Payer: MEDICARE

## 2021-05-20 DIAGNOSIS — M25.552 PAIN IN LEFT HIP: ICD-10-CM

## 2021-05-20 DIAGNOSIS — M70.62 GREATER TROCHANTERIC BURSITIS OF LEFT HIP: Primary | ICD-10-CM

## 2021-05-20 PROCEDURE — 97110 THERAPEUTIC EXERCISES: CPT | Performed by: PHYSICAL THERAPIST

## 2021-05-20 PROCEDURE — 97530 THERAPEUTIC ACTIVITIES: CPT | Performed by: PHYSICAL THERAPIST

## 2021-05-20 PROCEDURE — 97112 NEUROMUSCULAR REEDUCATION: CPT | Performed by: PHYSICAL THERAPIST

## 2021-05-20 NOTE — PROGRESS NOTES
Daily Note     Today's date: 2021  Patient name: Lazara Guzman  : 1940  MRN: [id number]  Referring provider: Kathy Rodriguez MD  Dx:   Encounter Diagnosis     ICD-10-CM    1  Greater trochanteric bursitis of left hip  M70 62    2  Pain in left hip  M25 552                   Subjective: Pt reports being able to lay on her L side on Tuesday, and states she did not have any problems  Last night, she tried to sleep on L side again and reports pain and tingling in hip during sleep towards morning and after getting up in the morning but it did not last long  Objective: See treatment diary below      Assessment: Tolerated treatment well  Her tolerance to L SL has improved but she is not back to prev LOF  Pt was educated that she was not directed to sleep on her L side every night; rather, she was instructed to attempt laying on L side to see if there was any improvement  Patient exhibited good technique with therapeutic exercises and would benefit from continued PT to improve her balance and dec her pain w/ L SL  Plan: Continue per plan of care        Precautions: hx vertigo; hx falls; hx R wrist fx  SOC: 2021  FOTO: 2021  POC expiration: 8/3/2021  Daily Treatment Log  Date 21    Visit 1 2  3 4    Manual                                        Ther Ex  20'  15' 15'    Supine piriformis stretch R/L 20"x3 Figure 4 stretch 20"x3 R/L  30"x2 R/L  30"x1 R/L    ham stretch w/SOS R/L 20"x3 20"x3 R/L  20"x3 R/L 30"x1 R/L    Bridges 1x10 2x10  2x10  3" hold 2x10    Prone on elbows B/L knee flexion  B/L 1x10 2x10  1x10     Standing ITB stretch on wall R/L 20"x1 R/L 20"x2  20"x3 R/L 20"x3 R/L    Standing hip abd    BTB 2x10 R/L    Supine hip flexor stretch off table   W/SOS 1x30'' 1/SOS 1x30" R/L    HEP administered       Ther Activity   10' 10'    Sit to stand  2x10 low mat  2x10 low mat 2x10 low mat     Side step w/ TB  RTB 2 laps at rail CS  RTB 2 laps at rail CS  BTB 2 laps at rail CS BTB 2 laps at rail                             NM Re Ed  25'  20' 20'    Tandem balance CGA  20"x2 R/L 20"x2 R/L 20"x2 R/L    FT EC on foam CGA  EO 20"x1   EC 20"x2 EC 20"x2 EC 20"x3 CG    Alt tap ups to step  4" 1x10 R/L   6" from foam 1x10 R/L   6" 1x10 R/L   6" 1x10 R/L shoes off on foam  6" 2x10 R/L w/ rail, 6" 1x10 E/L w/o rail    Clamshells R SL  2x10  2x10 BTB 1x10, 1x15            Modalities         CP to end        Estim w/ CP prn                          Treatment was performed by Tara Bustamante SPT under direct supervision of Yoav Page MPT, cert MDT  Access Code: [email or web address]  URL: [email or web address]  Date: 05/11/2021  Prepared by:  Yoav Page    Exercises  Supine Figure 4 Piriformis Stretch - 1 x daily - 7 x weekly - 4 reps - 20 hold  Supine Hamstring Stretch with Strap - 1 x daily - 7 x weekly - 4 reps - 20 hold  Supine Bridge - 1 x daily - 7 x weekly - 2 sets - 10 reps  Static Prone on Elbows - 1 x daily - 7 x weekly - 2 sets - 10 reps  ITB Stretch at Wall - 1 x daily - 7 x weekly - 3 reps - 20 hold

## 2021-05-25 ENCOUNTER — OFFICE VISIT (OUTPATIENT)
Dept: PHYSICAL THERAPY | Facility: CLINIC | Age: 81
End: 2021-05-25
Payer: MEDICARE

## 2021-05-25 DIAGNOSIS — M70.62 GREATER TROCHANTERIC BURSITIS OF LEFT HIP: Primary | ICD-10-CM

## 2021-05-25 DIAGNOSIS — M25.552 PAIN IN LEFT HIP: ICD-10-CM

## 2021-05-25 PROCEDURE — 97530 THERAPEUTIC ACTIVITIES: CPT | Performed by: PHYSICAL THERAPIST

## 2021-05-25 PROCEDURE — 97112 NEUROMUSCULAR REEDUCATION: CPT | Performed by: PHYSICAL THERAPIST

## 2021-05-25 PROCEDURE — 97110 THERAPEUTIC EXERCISES: CPT | Performed by: PHYSICAL THERAPIST

## 2021-05-25 NOTE — PROGRESS NOTES
Daily Note     Today's date: 2021  Patient name: Tabby Waddell  : 1940  MRN: [id number]  Referring provider: Matthias Powers MD  Dx:   Encounter Diagnosis     ICD-10-CM    1  Greater trochanteric bursitis of left hip  M70 62    2  Pain in left hip  M25 552                   Subjective: Pt reports being able to sleep on her L side through the night w/o c/o pain  Pt states she feels that exercises in PT sessions are truly helping her get better  Pt reports 0/10 pain w/ L SL in session  Objective: See treatment diary below  Hip ext w/ knee ext is 5/5 B/L, and L hip abd is 4+/5  Assessment: Tolerated treatment well  Patient exhibited good technique with therapeutic exercises  Pt was directed to bring the script she has for balance into next session so we can complete RE-eval to address this  Plan: Pt wants to initiate balance therapy and has script for it   Re-eval next visit     Precautions: hx vertigo; hx falls; hx R wrist fx  SOC: 2021  FOTO: 2021  POC expiration: 8/3/2021  Daily Treatment Log  Date 21   Visit 1 2  3 4 5   Manual                                        Ther Ex  20'  15' 15' 15'   Supine piriformis stretch R/L 20"x3 Figure 4 stretch 20"x3 R/L  30"x2 R/L  30"x1 R/L    ham stretch w/SOS R/L 20"x3 20"x3 R/L  20"x3 R/L 30"x1 R/L 1' R/L    Bridges 1x10 2x10  2x10  3" hold 2x10 3" hold 1x10   Prone on elbows B/L knee flexion  B/L 1x10 2x10  1x10  20x   Standing ITB stretch on wall R/L 20"x1 R/L 20"x2  20"x3 R/L 20"x3 R/L 20"x2 R/L   Standing hip abd    BTB 2x10 R/L BTB ankles 2x10 R/L    Supine hip flexor stretch off table   W/SOS 1x30'' 1/SOS 1x30" R/L 1' R/L   HEP administered       Ther Activity   10' 10' 10'   Sit to stand  2x10 low mat  2x10 low mat 2x10 low mat  2x15 low mat   Side step w/ TB  RTB 2 laps at rail CS  RTB 2 laps at rail CS  BTB 2 laps at rail CS BTB 2 laps at rail  BTB 8'x2 R/L        MMT hip abd  NM Re Ed  25'  20' 20' 20'   Tandem balance CGA  20"x2 R/L 20"x2 R/L 20"x2 R/L         SLS R/L    FT EC on foam CGA  EO 20"x1   EC 20"x2 EC 20"x2 EC 20"x3 CG EC 20"x   Alt tap ups to step  4" 1x10 R/L   6" from foam 1x10 R/L   6" 1x10 R/L   6" 1x10 R/L shoes off on foam  6" 2x10 R/L w/ rail, 6" 1x10 R/L w/o rail 6" 2x10 w/o rail  8" 1x10 w/o rail    Clamshells R SL  2x10  2x10 BTB 1x10, 1x15 20x           Modalities         CP to end        Estim w/ CP prn                          Treatment was performed by YOVANNY Stoddard under direct supervision of Fletcher Baker, KASIA, cert MDT  Access Code: [email or web address]  URL: [email or web address]  [email or web address]  Date: 05/11/2021  Prepared by:  Fletcher Baker    Exercises  Supine Figure 4 Piriformis Stretch - 1 x daily - 7 x weekly - 4 reps - 20 hold  Supine Hamstring Stretch with Strap - 1 x daily - 7 x weekly - 4 reps - 20 hold  Supine Bridge - 1 x daily - 7 x weekly - 2 sets - 10 reps  Static Prone on Elbows - 1 x daily - 7 x weekly - 2 sets - 10 reps  ITB Stretch at Wall - 1 x daily - 7 x weekly - 3 reps - 20 hold

## 2021-05-27 ENCOUNTER — EVALUATION (OUTPATIENT)
Dept: PHYSICAL THERAPY | Facility: CLINIC | Age: 81
End: 2021-05-27
Payer: MEDICARE

## 2021-05-27 DIAGNOSIS — M70.62 GREATER TROCHANTERIC BURSITIS OF LEFT HIP: ICD-10-CM

## 2021-05-27 DIAGNOSIS — M25.552 PAIN IN LEFT HIP: ICD-10-CM

## 2021-05-27 DIAGNOSIS — H81.11 BPPV (BENIGN PAROXYSMAL POSITIONAL VERTIGO), RIGHT: Primary | ICD-10-CM

## 2021-05-27 DIAGNOSIS — R26.89 IMBALANCE: ICD-10-CM

## 2021-05-27 PROCEDURE — 97112 NEUROMUSCULAR REEDUCATION: CPT

## 2021-05-27 PROCEDURE — 97164 PT RE-EVAL EST PLAN CARE: CPT

## 2021-05-27 NOTE — LETTER
2021    [address]  [place] [phone]    Patient: Melyssa Salinas   YOB: 1940   Date of Visit: 2021     Encounter Diagnosis     ICD-10-CM    1  BPPV (benign paroxysmal positional vertigo), right  H81 11    2  Greater trochanteric bursitis of left hip  M70 62    3  Pain in left hip  M25 552    4  Imbalance  R26 89        Dear Dr Marietta James: Thank you for your recent referral of Melyssa Salinas  Please review the attached evaluation summary from Priya's recent visit  Please verify that you agree with the plan of care by signing the attached order  If you have any questions or concerns, please do not hesitate to call  I sincerely appreciate the opportunity to share in the care of one of your patients and hope to have another opportunity to work with you in the near future  Sincerely,    Tamar Burdick, PT      Referring Provider:      I certify that I have read the below Plan of Care and certify the need for these services furnished under this plan of treatment while under my care  MD [phone]  Via Fax: [phone]          PT Re-Evaluation     Today's date: 2021  Patient name: Melyssa Salinas  : 1940  MRN: [id number]  Referring provider: Ho Mackay MD  Dx:   Encounter Diagnosis     ICD-10-CM    1  BPPV (benign paroxysmal positional vertigo), right  H81 11    2  Greater trochanteric bursitis of left hip  M70 62    3  Pain in left hip  M25 552    4  Imbalance  R26 89        Start Time: 1400  Stop Time: 1445  Total time in clinic (min): 45 minutes    Assessment  Assessment details: 2021: Melyssa Salinas is a 78 y o  female who presents with pain, decreased strength, limited flexibility, postural dysfunction, limited balance and tenderness to direct pressure/palpation   Due to these impairments, patient has difficulty performing ADL's, transfers  Patient's clinical presentation is consistent with their referring diagnosis of Greater trochanteric bursitis of left hip and Pain in left hip  Lumbar mechanical assessment does not alter symptoms which are reported as pain only w/ L side lying  She reports pain reproduction with direct palpation to greater trochante and gluts  Plan: Ambulatory referral to Physical Therapy  Patient has been educated in home exercise program and plan of care  Patient would benefit from skilled physical therapy services to address their aforementioned functional limitations and progress towards prior level of function and independence with home exercise program      5/27/2021: Pt can now lay on her L side through the night with mild pain the following morning  Pt was directed to try completing her HEP in the morning instead of at night and see if her pain is resolved  Pt still has limited L side flexibility in hams and hip flexors, but is compliant w/ HEP to stretch them  Pt scored 45/56 on WITT, placing her at increased risk of falls and has most difficulty w/ SLS and tandem stance  Poor ability to perform tandem walking with full tandem achieved, with <2 steps consecutively completed  Pt has mod sway w/ EC balance activities  She reported mild dizziness when sitting up after Epley maneuver but without nystagmus  Pt would benefit from continued skilled physical therapy to address her balance limitations and management of BPPV as appropriate  Impairments: abnormal gait, abnormal or restricted ROM, activity intolerance, impaired balance, impaired physical strength, lacks appropriate home exercise program, pain with function, weight-bearing intolerance and poor body mechanics  Other impairment: limited balance     Symptom irritability: lowUnderstanding of Dx/Px/POC: good   Prognosis: good    Goals  Short Term Goals: to be completed in 4 weeks (6/8/21)  1   Pt will have decreased pain to 0-3/10 when in L side lie to inc comfort when sleeping  - MET  2  Pt to be able to perform SLS on R/L side for 15 sec w/EO to improve balance  - NOT MET  3  Pt will be I with HEP  - MET  4  Pt to inc R/L hip abd to 5/5 to inc tolerance in SLS  - MET  NEW GOAL 5/27/21: Pt to improve tandem balance B/L to 15 sec w/ EO  Long Term Goals: to be completed in 12 weeks (8/3/21)  1  Pt will have 0/10 pain when in L SL to be able to sleep through the night on L side  - MET  2  Pt to inc FOTO score by at least 15 points to improve function with household work  - NOT MET  3  Pt to inc hip ext strength B/L to 5/5 to improve walking tolerance  - NOT MET  4  Pt to inc R/L hip IR ROM by 10 degrees to improve ability to perform self care  - NOT MET  5  Pt to be able to perform SLS on R/L side for 30 sec w/EO to reduce risk of falls  - NOT MET  NEW GOAL 5/27/21: Pt to inc WITT score by 5 points or more to improve her ability to walk on uneven surfaces  NEW GOAL 5/27/21: Pt to be I in HEP for balance and BPPV if needed  Plan  Plan details:   Initiation of balance training and shift in focus of therapy from hip pain, stretching, strengthening to balance, coordination and decreasing risk of falling, as well as fear of falling     Patient would benefit from: skilled physical therapy  Planned modality interventions: cryotherapy, thermotherapy: hydrocollator packs and unattended electrical stimulation  Planned therapy interventions: manual therapy, balance, neuromuscular re-education, patient education, postural training, stretching, strengthening, therapeutic activities, therapeutic exercise, flexibility, functional ROM exercises and home exercise program  Frequency: 2x week  Duration in weeks: 8  Plan of Care beginning date: 5/27/2021  Plan of Care expiration date: 7/22/2021  Treatment plan discussed with: patient        Subjective Evaluation    History of Present Illness  Date of onset: 3/1/2021  Mechanism of injury: Subjective 21: Pt has PMH of vertigo and a fall 10 years ago  In beginning of 2021, pt had L ear pain and visited PCP, who told to her sleep on L side  About two weeks later, pts L hip started hurting  She began avoiding sleeping on L side ever since  Pt slipped and fell [place] a bowling [place] on L hip 10 years ago, but is not sure this is the cause because there was no pain until recently  21: Pt states she can lay on her L side comfortably through the night now, and just has mild pain when she wakes up which may be due to completing her HEP at night  Pt has been previously diagnosed with R BPPV and stated she would now like to work primarily on this and her balance as her hip has mostly resolved  Pt reports previous treatment for vertigo by her physician but states she was not compliant w/ HEP  She states she has the most difficulty w/ laying down for bed with onset of dizziness with lying down, sitting up  Not a recurrent problem   Quality of life: good    Pain  Current pain ratin  At best pain ratin  Pain location: lat hip into ant thigh  Quality: discofort in hip when waking up, gets better throughout day  Relieving factors: change in position  Progression: improved    Treatments  Previous treatment: physical therapy  Current treatment: physical therapy  Patient Goals  Patient goals for therapy: improved balance and increased strength  Patient's goals regarding treatment: Decrease pain with lying on L side, improve balance          Objective  A/PROM:  L  R  L     21  Hip ER prone  45P  45P  45P   Hip IR prone  25P  25P  25P    MMT:    L  R  R  L     21  Hip flex   NT  NT  5/5  5/5  Hip abd  5/5  5/5  5/5  4+/5  Hip ER   NT  NT  5/5  5/5  Hip IR   NT  NT  5/5  5/5  Hip add  4+/5  4+/5  NT  NT  Hip ext W/knee ext 4+/5  4+/5  3+/5  3+/5  Knee flex  5/5  NT  5/5  5/5*  Knee ext  NT  NT  5/5  5/5    Lumbar screen: Lumbar AROM limitation     21   Flexion:  mod  Mod    Extension:  mod  alexsandra   R Side glide:  mod  mod   L Side glide: mod  mod    Mechanical Assessment: pre-test symptoms include pain w/ L SL         21  Repeated Ext in Lying (REIL)x10 NE    Balance:   21     EO  EO  EC  Tandem R post 7 sec  14 sec  NT  Tandem L post 5 sec  3 sec  NT  SLS R   5 sec  3 sec  NT  SLS L    3 sec  3 sec  NT    Reflexes: patellar and achilles normal     Nerve Testin21- L sciatic nerve tightness w/ 45 deg hip flexion and adduction    Function: 21- sleep is no longer disturbed, but pt feels sore in the morning  21- sleep is disturbed when pt is SL on L and relieved once moved out of this position    Palpation: 21- L GT no longer TTP  21- tender at L GT, sore at L iliac crest, sensitive at L ITB, no tenderness posteriorly    Flexibility: 21- still has L side limitations stated below  21- L hamstring tightness, L hip flexor end range tightness  R side hip flexor is minimally limited compared to L    BPPV test: 21- Performed Epley maneuver with min-no dizziness noted with lying down, no nystagmus, and mild dizziness reported with return to sitting lasting <5 seconds and without noted nystagmus  Will re-assess at next session  Outcome Measures Initial Assess  2021        5xSTS NT TBA       TUG NT TBA       10 meter NT TBA       WITT 45/56        FGA NT/30        DGI NT/24        mCTSIB  - FTEO (firm)  - FTEC (firm)  - FTEO (foam)    - FTEC (foam)   30+ sec  30+ sec  30+ sec mod sway  10 sec-mod sway, limiting tolerance        6MWT NT ft        Tandem Gait Significant difficulty with pt unable to complete >2 steps consecutively                   Dynamic Visual Acuity attempted, with pt reading line 7 @10 feet, with poor ability to turn head at appropriate speed to complete dynamic portion of test  Patient noted to slow/stop turning head to read each letter       Cut off scores: All data taken from [facility] or Rehab Measures    [place]: <46/56=falls risk  MDC: 6 pts  Age Norms:  61-76: M - 54   F - 55  70-79: M - 47   F - 53  80-89: M - 48   F - 50 5xSTS: Osbaldo et al 2010  MDC: 2 3 sec  Age Norms:  60-69: 11 1 sec  70-79: 12 6 sec  80-89: 14 8 sec   TUG  MDC: 4 14 sec  Cut off score:  >13 5 sec community dwelling adults  >32 2 sec frail elderly  <20 sec:  I for basic transfers  >30: dependent for transfers 10 Meter Walk Test Norms: Milton Tamayo and Shakir noel 2011  20-29: M - 1 35 m   F - 1 34 m  30-39: M - 1 43 m   F - 1 34 m  40-49: M - 1 43 m   F - 1 39 m  50-59: M - 1 43 m   F - 1 31 m  60-69: M - 1 34 m   F - 1 24 m  70-79: M - 1 26 m   F - 1 13 m  80-89: M - 0 97 m   F - 0 94 m   FGA  MCID: 4 pts  Geriatrics/community < 22/30 fall risk  Geriatrics/community < 20/30 unexplained falls DGI  MDC: vestibular - 4 pts  MDC: geriatric/community - 3 pts  Falls risk <19/24   6 Minute Walk Test  Age Norms  61-76: M - 1876 ft (571 80 m)  F - 1765 ft (537 98 m)  70-79: M - 1729 ft (527 00 m)  F - 1545 ft (470 92 m)  80-89: M - 1368 ft (416 97 m)  F - 1286 ft (391 97 m) mCTSIB  Norm: 20-60 yrs  Eyes open firm: norm sway 0 21-0 48  Eyes closed firm: norm sway 0 48-0 99  Eyes open foam: norm sway 0 38-0 71  Eyes closed foam: norm sway 0 70-2 22       Precautions: hx vertigo; hx falls; hx R wrist fx  SOC: 5/11/2021  FOTO: 5/11/2021  POC expiration: 8/3/2021  Daily Treatment Log  Date 5/27/21       Visit 6       Manual                                        Ther Ex        Supine piriformis stretch        ham stretch w/SOS        Bridges        Prone on elbows B/L knee flexion         Standing ITB stretch on wall R/L 20"x2       Standing hip abd        Supine hip flexor stretch off table        HEP updated       Ther Activity        Sit to stand 1x5 chair        Side step w/ TB 8'x4 RTB ankles                               NM Re Ed 10'       Tandem balance CGA R/L 2x10" w/o rail SLS  R/L 2x10" w/o rail       FT EC on foam CGA EO 30"  EC 10"       Alt tap ups to step 4'' 1x10 w/o rail       Clamshells R SL        Standing SLR 3 ways        Stepping over hurdles                Modalities                                           Assessment and Treatment was performed by YOVANNY Pereyra under direct supervision and input of KARL Ordaz, ALLI  Access Code: [email or web address]  URL: [email or web address]  Date: 05/11/2021  Prepared by:  Monique Lynch    Exercises  Supine Figure 4 Piriformis Stretch - 1 x daily - 7 x weekly - 4 reps - 20 hold  Supine Hamstring Stretch with Strap - 1 x daily - 7 x weekly - 4 reps - 20 hold  Supine Bridge - 1 x daily - 7 x weekly - 2 sets - 10 reps  Static Prone on Elbows - 1 x daily - 7 x weekly - 2 sets - 10 reps  ITB Stretch at Wall - 1 x daily - 7 x weekly - 3 reps - 20 hold  Standing Romberg to 3/4 Tandem Stance - 1 x daily - 7 x weekly - 3 sets - 30 hold  Single Leg Stance with Support - 1 x daily - 7 x weekly - 3 sets - 30 hold

## 2021-05-27 NOTE — PROGRESS NOTES
PT Re-Evaluation     Today's date: 2021  Patient name: Lisa Marr  : 1940  MRN: [id number]  Referring provider: Vicente Fine MD  Dx:   Encounter Diagnosis     ICD-10-CM    1  BPPV (benign paroxysmal positional vertigo), right  H81 11    2  Greater trochanteric bursitis of left hip  M70 62    3  Pain in left hip  M25 552    4  Imbalance  R26 89        Start Time: 1400  Stop Time: 1445  Total time in clinic (min): 45 minutes    Assessment  Assessment details: 2021: Lisa Marr is a 78 y o  female who presents with pain, decreased strength, limited flexibility, postural dysfunction, limited balance and tenderness to direct pressure/palpation  Due to these impairments, patient has difficulty performing ADL's, transfers  Patient's clinical presentation is consistent with their referring diagnosis of Greater trochanteric bursitis of left hip and Pain in left hip  Lumbar mechanical assessment does not alter symptoms which are reported as pain only w/ L side lying  She reports pain reproduction with direct palpation to greater trochante and gluts  Plan: Ambulatory referral to Physical Therapy  Patient has been educated in home exercise program and plan of care  Patient would benefit from skilled physical therapy services to address their aforementioned functional limitations and progress towards prior level of function and independence with home exercise program      2021: Pt can now lay on her L side through the night with mild pain the following morning  Pt was directed to try completing her HEP in the morning instead of at night and see if her pain is resolved  Pt still has limited L side flexibility in hams and hip flexors, but is compliant w/ HEP to stretch them  Pt scored 45/56 on WITT, placing her at increased risk of falls and has most difficulty w/ SLS and tandem stance  Poor ability to perform tandem walking with full tandem achieved, with <2 steps consecutively completed   Pt has mod sway w/ EC balance activities  She reported mild dizziness when sitting up after Epley maneuver but without nystagmus  Pt would benefit from continued skilled physical therapy to address her balance limitations and management of BPPV as appropriate  Impairments: abnormal gait, abnormal or restricted ROM, activity intolerance, impaired balance, impaired physical strength, lacks appropriate home exercise program, pain with function, weight-bearing intolerance and poor body mechanics  Other impairment: limited balance     Symptom irritability: lowUnderstanding of Dx/Px/POC: good   Prognosis: good    Goals  Short Term Goals: to be completed in 4 weeks (6/8/21)  1  Pt will have decreased pain to 0-3/10 when in L side lie to inc comfort when sleeping  - MET  2  Pt to be able to perform SLS on R/L side for 15 sec w/EO to improve balance  - NOT MET  3  Pt will be I with HEP  - MET  4  Pt to inc R/L hip abd to 5/5 to inc tolerance in SLS  - MET  NEW GOAL 5/27/21: Pt to improve tandem balance B/L to 15 sec w/ EO  Long Term Goals: to be completed in 12 weeks (8/3/21)  1  Pt will have 0/10 pain when in L SL to be able to sleep through the night on L side  - MET  2  Pt to inc FOTO score by at least 15 points to improve function with household work  - NOT MET  3  Pt to inc hip ext strength B/L to 5/5 to improve walking tolerance  - NOT MET  4  Pt to inc R/L hip IR ROM by 10 degrees to improve ability to perform self care  - NOT MET  5  Pt to be able to perform SLS on R/L side for 30 sec w/EO to reduce risk of falls  - NOT MET  NEW GOAL 5/27/21: Pt to inc WITT score by 5 points or more to improve her ability to walk on uneven surfaces  NEW GOAL 5/27/21: Pt to be I in HEP for balance and BPPV if needed  Plan  Plan details:   Initiation of balance training and shift in focus of therapy from hip pain, stretching, strengthening to balance, coordination and decreasing risk of falling, as well as fear of falling     Patient would benefit from: skilled physical therapy  Planned modality interventions: cryotherapy, thermotherapy: hydrocollator packs and unattended electrical stimulation  Planned therapy interventions: manual therapy, balance, neuromuscular re-education, patient education, postural training, stretching, strengthening, therapeutic activities, therapeutic exercise, flexibility, functional ROM exercises and home exercise program  Frequency: 2x week  Duration in weeks: 8  Plan of Care beginning date: 2021  Plan of Care expiration date: 2021  Treatment plan discussed with: patient        Subjective Evaluation    History of Present Illness  Date of onset: 3/1/2021  Mechanism of injury: Subjective 21: Pt has PMH of vertigo and a fall 10 years ago  In beginning of 2021, pt had L ear pain and visited PCP, who told to her sleep on L side  About two weeks later, pts L hip started hurting  She began avoiding sleeping on L side ever since  Pt slipped and fell at a bowling alley on L hip 10 years ago, but is not sure this is the cause because there was no pain until recently  21: Pt states she can lay on her L side comfortably through the night now, and just has mild pain when she wakes up which may be due to completing her HEP at night  Pt has been previously diagnosed with R BPPV and stated she would now like to work primarily on this and her balance as her hip has mostly resolved  Pt reports previous treatment for vertigo by her physician but states she was not compliant w/ HEP  She states she has the most difficulty w/ laying down for bed with onset of dizziness with lying down, sitting up  Not a recurrent problem   Quality of life: good    Pain  Current pain ratin  At best pain ratin  Pain location: lat hip into ant thigh  Quality: discofort in hip when waking up, gets better throughout day    Relieving factors: change in position  Progression: improved    Treatments  Previous treatment: physical therapy  Current treatment: physical therapy  Patient Goals  Patient goals for therapy: improved balance and increased strength  Patient's goals regarding treatment: Decrease pain with lying on L side, improve balance  Objective  A/PROM:  L  R  L     21  Hip ER prone  45P  45P  45P   Hip IR prone  25P  25P  25P    MMT:    L  R  R  L     21  Hip flex   NT  NT    5/5  Hip abd  /  5/  5/5  4+/5  Hip ER   NT  NT  5/5  5/5  Hip IR   NT  NT  /5  5/5  Hip add  4+/5  4+/5  NT  NT  Hip ext W/knee ext 4+/5  4+/5  3+/5  3+/5  Knee flex  /  NT  5/  5/5*  Knee ext  NT  NT  /  5/5    Lumbar screen: Lumbar AROM limitation     21   Flexion:  mod  Mod    Extension:  mod  alexsandra   R Side glide:  mod  mod   L Side glide: mod  mod    Mechanical Assessment: pre-test symptoms include pain w/ L SL         21  Repeated Ext in Lying (REIL)x10 NE    Balance:   21     EO  EO  EC  Tandem R post 7 sec  14 sec  NT  Tandem L post 5 sec  3 sec  NT  SLS R   5 sec  3 sec  NT  SLS L    3 sec  3 sec  NT    Reflexes: patellar and achilles normal     Nerve Testin21- L sciatic nerve tightness w/ 45 deg hip flexion and adduction    Function: 21- sleep is no longer disturbed, but pt feels sore in the morning  21- sleep is disturbed when pt is SL on L and relieved once moved out of this position    Palpation: 21- L GT no longer TTP  21- tender at L GT, sore at L iliac crest, sensitive at L ITB, no tenderness posteriorly    Flexibility: 21- still has L side limitations stated below  21- L hamstring tightness, L hip flexor end range tightness  R side hip flexor is minimally limited compared to L    BPPV test: 21- Performed Epley maneuver with min-no dizziness noted with lying down, no nystagmus, and mild dizziness reported with return to sitting lasting <5 seconds and without noted nystagmus   Will re-assess at next session  Outcome Measures Initial Assess  5/27/2021        5xSTS NT TBA       TUG NT TBA       10 meter NT TBA       WITT 45/56        FGA NT/30        DGI NT/24        mCTSIB  - FTEO (firm)  - FTEC (firm)  - FTEO (foam)    - FTEC (foam)   30+ sec  30+ sec  30+ sec mod sway  10 sec-mod sway, limiting tolerance        6MWT NT ft        Tandem Gait Significant difficulty with pt unable to complete >2 steps consecutively                   Dynamic Visual Acuity attempted, with pt reading line 7 @10 feet, with poor ability to turn head at appropriate speed to complete dynamic portion of test  Patient noted to slow/stop turning head to read each letter  Cut off scores: All data taken from [facility] Section or Rehab Measures    Witt: <46/56=falls risk  MDC: 6 pts  Age Norms:  61-76: M - 54   F - 55  70-79: M - 47   F - 53  80-89: M - 48   F - 50 5xSTS: Osbaldo et al 2010  MDC: 2 3 sec  Age Norms:  60-69: 11 1 sec  70-79: 12 6 sec  80-89: 14 8 sec   TUG  MDC: 4 14 sec  Cut off score:  >13 5 sec community dwelling adults  >32 2 sec frail elderly  <20 sec:  I for basic transfers  >30: dependent for transfers 10 Meter Walk Test Norms: Andrea Homans and Yessenia noel 2011  20-29: M - 1 35 m   F - 1 34 m  30-39: M - 1 43 m   F - 1 34 m  40-49: M - 1 43 m   F - 1 39 m  50-59: M - 1 43 m   F - 1 31 m  60-69: M - 1 34 m   F - 1 24 m  70-79: M - 1 26 m   F - 1 13 m  80-89: M - 0 97 m   F - 0 94 m   FGA  MCID: 4 pts  Geriatrics/community < 22/30 fall risk  Geriatrics/community < 20/30 unexplained falls DGI  MDC: vestibular - 4 pts  MDC: geriatric/community - 3 pts  Falls risk <19/24   6 Minute Walk Test  Age Norms  61-76: M - 1876 ft (571 80 m)  F - 1765 ft (537 98 m)  70-79: M - 1729 ft (527 00 m)  F - 1545 ft (470 92 m)  80-89: M - 1368 ft (416 97 m)  F - 1286 ft (391 97 m) mCTSIB  Norm: 20-60 yrs  Eyes open firm: norm sway 0 21-0 48  Eyes closed firm: norm sway 0 48-0 99  Eyes open foam: norm sway 0  38-0 71  Eyes closed foam: norm sway 0 70-2 22       Precautions: hx vertigo; hx falls; hx R wrist fx  SOC: 5/11/2021  FOTO: 5/11/2021  POC expiration: 8/3/2021  Daily Treatment Log  Date 5/27/21       Visit 6       Manual                                        Ther Ex        Supine piriformis stretch        ham stretch w/SOS        Bridges        Prone on elbows B/L knee flexion         Standing ITB stretch on wall R/L 20"x2       Standing hip abd        Supine hip flexor stretch off table        HEP updated       Ther Activity        Sit to stand 1x5 chair        Side step w/ TB 8'x4 RTB ankles                               NM Re Ed 10'       Tandem balance CGA R/L 2x10" w/o rail       SLS  R/L 2x10" w/o rail       FT EC on foam CGA EO 30"  EC 10"       Alt tap ups to step 4'' 1x10 w/o rail       Clamshells R SL        Standing SLR 3 ways        Stepping over hurdles                Modalities                                           Assessment and Treatment was performed by YOVANNY Atkinson under direct supervision and input of Peri Tomlin, MSPT, NCS  Access Code: [email or web address]  URL: [email or web address]  Date: 05/11/2021  Prepared by:  Annamarie Davis    Exercises  Supine Figure 4 Piriformis Stretch - 1 x daily - 7 x weekly - 4 reps - 20 hold  Supine Hamstring Stretch with Strap - 1 x daily - 7 x weekly - 4 reps - 20 hold  Supine Bridge - 1 x daily - 7 x weekly - 2 sets - 10 reps  Static Prone on Elbows - 1 x daily - 7 x weekly - 2 sets - 10 reps  ITB Stretch at Wall - 1 x daily - 7 x weekly - 3 reps - 20 hold  Standing Romberg to 3/4 Tandem Stance - 1 x daily - 7 x weekly - 3 sets - 30 hold  Single Leg Stance with Support - 1 x daily - 7 x weekly - 3 sets - 30 hold

## 2021-05-27 NOTE — LETTER
2021    Deric Sylvester DO  [address] [address]    Patient: Jeri Flor   YOB: 1940   Date of Visit: 2021     Encounter Diagnosis     ICD-10-CM    1  BPPV (benign paroxysmal positional vertigo), right  H81 11    2  Greater trochanteric bursitis of left hip  M70 62    3  Pain in left hip  M25 552    4  Imbalance  R26 89        Dear Dr Lisa Mosqueda: Thank you for your recent referral of Jeri Flor  Please review the attached evaluation summary from Priya's recent visit  Please verify that you agree with the plan of care by signing the attached order  If you have any questions or concerns, please do not hesitate to call  I sincerely appreciate the opportunity to share in the care of one of your patients and hope to have another opportunity to work with you in the near future  Sincerely,    Peri Tomlin, PT      Referring Provider:      I certify that I have read the below Plan of Care and certify the need for these services furnished under this plan of treatment while under my care  Deric Sylvester DO  [address] [address]  Via Fax: [phone]          PT Re-Evaluation     Today's date: 2021  Patient name: Jeri Flor  : 1940  MRN: [id number]  Referring provider: Marco Pitts MD  Dx:   Encounter Diagnosis     ICD-10-CM    1  BPPV (benign paroxysmal positional vertigo), right  H81 11    2  Greater trochanteric bursitis of left hip  M70 62    3  Pain in left hip  M25 552    4  Imbalance  R26 89        Start Time: 1400  Stop Time: 1445  Total time in clinic (min): 45 minutes    Assessment  Assessment details: 2021: Jeri Flor is a 78 y o  female who presents with pain, decreased strength, limited flexibility, postural dysfunction, limited balance and tenderness to direct pressure/palpation  Due to these impairments, patient has difficulty performing ADL's, transfers   Patient's clinical presentation is consistent with their referring diagnosis of Greater trochanteric bursitis of left hip and Pain in left hip  Lumbar mechanical assessment does not alter symptoms which are reported as pain only w/ L side lying  She reports pain reproduction with direct palpation to greater trochante and gluts  Plan: Ambulatory referral to Physical Therapy  Patient has been educated in home exercise program and plan of care  Patient would benefit from skilled physical therapy services to address their aforementioned functional limitations and progress towards prior level of function and independence with home exercise program      5/27/2021: Pt can now lay on her L side through the night with mild pain the following morning  Pt was directed to try completing her HEP in the morning instead of at night and see if her pain is resolved  Pt still has limited L side flexibility in hams and hip flexors, but is compliant w/ HEP to stretch them  Pt scored 45/56 on WITT, placing her at increased risk of falls and has most difficulty w/ SLS and tandem stance  Poor ability to perform tandem walking with full tandem achieved, with <2 steps consecutively completed  Pt has mod sway w/ EC balance activities  She reported mild dizziness when sitting up after Epley maneuver but without nystagmus  Pt would benefit from continued skilled physical therapy to address her balance limitations and management of BPPV as appropriate  Impairments: abnormal gait, abnormal or restricted ROM, activity intolerance, impaired balance, impaired physical strength, lacks appropriate home exercise program, pain with function, weight-bearing intolerance and poor body mechanics  Other impairment: limited balance     Symptom irritability: lowUnderstanding of Dx/Px/POC: good   Prognosis: good    Goals  Short Term Goals: to be completed in 4 weeks (6/8/21)  1  Pt will have decreased pain to 0-3/10 when in L side lie to inc comfort when sleeping  - MET  2   Pt to be able to perform SLS on R/L side for 15 sec w/EO to improve balance  - NOT MET  3  Pt will be I with HEP  - MET  4  Pt to inc R/L hip abd to 5/5 to inc tolerance in SLS  - MET  NEW GOAL 5/27/21: Pt to improve tandem balance B/L to 15 sec w/ EO  Long Term Goals: to be completed in 12 weeks (8/3/21)  1  Pt will have 0/10 pain when in L SL to be able to sleep through the night on L side  - MET  2  Pt to inc FOTO score by at least 15 points to improve function with household work  - NOT MET  3  Pt to inc hip ext strength B/L to 5/5 to improve walking tolerance  - NOT MET  4  Pt to inc R/L hip IR ROM by 10 degrees to improve ability to perform self care  - NOT MET  5  Pt to be able to perform SLS on R/L side for 30 sec w/EO to reduce risk of falls  - NOT MET  NEW GOAL 5/27/21: Pt to inc WITT score by 5 points or more to improve her ability to walk on uneven surfaces  NEW GOAL 5/27/21: Pt to be I in HEP for balance and BPPV if needed  Plan  Plan details:   Initiation of balance training and shift in focus of therapy from hip pain, stretching, strengthening to balance, coordination and decreasing risk of falling, as well as fear of falling  Patient would benefit from: skilled physical therapy  Planned modality interventions: cryotherapy, thermotherapy: hydrocollator packs and unattended electrical stimulation  Planned therapy interventions: manual therapy, balance, neuromuscular re-education, patient education, postural training, stretching, strengthening, therapeutic activities, therapeutic exercise, flexibility, functional ROM exercises and home exercise program  Frequency: 2x week  Duration in weeks: 8  Plan of Care beginning date: 5/27/2021  Plan of Care expiration date: 7/22/2021  Treatment plan discussed with: patient        Subjective Evaluation    History of Present Illness  Date of onset: 3/1/2021  Mechanism of injury: Subjective 5/11/21: Pt has PMH of vertigo and a fall 10 years ago   In beginning of March , pt had L ear pain and visited PCP, who told to her sleep on L side  About two weeks later, pts L hip started hurting  She began avoiding sleeping on L side ever since  Pt slipped and fell at a bowling alley on L hip 10 years ago, but is not sure this is the cause because there was no pain until recently  21: Pt states she can lay on her L side comfortably through the night now, and just has mild pain when she wakes up which may be due to completing her HEP at night  Pt has been previously diagnosed with R BPPV and stated she would now like to work primarily on this and her balance as her hip has mostly resolved  Pt reports previous treatment for vertigo by her physician but states she was not compliant w/ HEP  She states she has the most difficulty w/ laying down for bed with onset of dizziness with lying down, sitting up  Not a recurrent problem   Quality of life: good    Pain  Current pain ratin  At best pain ratin  Pain location: lat hip into ant thigh  Quality: discofort in hip when waking up, gets better throughout day  Relieving factors: change in position  Progression: improved    Treatments  Previous treatment: physical therapy  Current treatment: physical therapy  Patient Goals  Patient goals for therapy: improved balance and increased strength  Patient's goals regarding treatment: Decrease pain with lying on L side, improve balance          Objective  A/PROM:  L  R  L     21  Hip ER prone  45P  45P  45P   Hip IR prone  25P  25P  25P    MMT:    L  R  R  L     21  Hip flex   NT  NT  5/5  5/5  Hip abd  5/5  5/5  5/5  4+/5  Hip ER   NT  NT  5/5  5/5  Hip IR   NT  NT  5/5  5/5  Hip add  4+/5  4+/5  NT  NT  Hip ext W/knee ext 4+/5  4+/5  3+/5  3+/5  Knee flex  5/5  NT  5/5  5/5*  Knee ext  NT  NT  5/5  5/5    Lumbar screen: Lumbar AROM limitation     21   Flexion:  mod  Mod    Extension:  mod  alexsandra   R Side glide: mod  mod   L Side glide: mod  mod    Mechanical Assessment: pre-test symptoms include pain w/ L SL         21  Repeated Ext in Lying (REIL)x10 NE    Balance:   21     EO  EO  EC  Tandem R post 7 sec  14 sec  NT  Tandem L post 5 sec  3 sec  NT  SLS R   5 sec  3 sec  NT  SLS L    3 sec  3 sec  NT    Reflexes: patellar and achilles normal     Nerve Testin21- L sciatic nerve tightness w/ 45 deg hip flexion and adduction    Function: 21- sleep is no longer disturbed, but pt feels sore in the morning  21- sleep is disturbed when pt is SL on L and relieved once moved out of this position    Palpation: 21- L GT no longer TTP  21- tender at L GT, sore at L iliac crest, sensitive at L ITB, no tenderness posteriorly    Flexibility: 21- still has L side limitations stated below  21- L hamstring tightness, L hip flexor end range tightness  R side hip flexor is minimally limited compared to L    BPPV test: 21- Performed Epley maneuver with min-no dizziness noted with lying down, no nystagmus, and mild dizziness reported with return to sitting lasting <5 seconds and without noted nystagmus  Will re-assess at next session  Outcome Measures Initial Assess  2021        5xSTS NT TBA       TUG NT TBA       10 meter NT TBA       WITT 45/56        FGA NT/30        DGI NT/24        mCTSIB  - FTEO (firm)  - FTEC (firm)  - FTEO (foam)    - FTEC (foam)   30+ sec  30+ sec  30+ sec mod sway  10 sec-mod sway, limiting tolerance        6MWT NT ft        Tandem Gait Significant difficulty with pt unable to complete >2 steps consecutively                   Dynamic Visual Acuity attempted, with pt reading line 7 @10 feet, with poor ability to turn head at appropriate speed to complete dynamic portion of test  Patient noted to slow/stop turning head to read each letter  Cut off scores:   All data taken from [facility] Section or Rehab Measures    Witt: <46/56=falls risk  MDC: 6 pts  Age Norms:  61-76: M - 54   F - 55  70-79: M - 47   F - 53  80-89: M - 48   F - 50 5xSTS: Osbaldo et al 2010  Mikayla Her[address]: 2 3 sec  Age Norms:  60-69: 11 1 sec  70-79: 12 6 sec  80-89: 14 8 sec   TUG  MDC: 4 14 sec  Cut off score:  >13 5 sec community dwelling adults  >32 2 sec frail elderly  <20 sec:  I for basic transfers  >30: dependent for transfers 10 Meter Walk Test Norms: Doe Jose and Anjana noel 2011  20-29: M - 1 35 m   F - 1 34 m  30-39: M - 1 43 m   F - 1 34 m  40-49: M - 1 43 m   F - 1 39 m  50-59: M - 1 43 m   F - 1 31 m  60-69: M - 1 34 m   F - 1 24 m  70-79: M - 1 26 m   F - 1 13 m  80-89: M - 0 97 m   F - 0 94 m   FGA  MCID: 4 pts  Geriatrics/community < 22/30 fall risk  Geriatrics/community < 20/30 unexplained falls DGI  MDC: vestibular - 4 pts  MDC: geriatric/community - 3 pts  Falls risk <19/24   6 Minute Walk Test  Age Norms  61-76: M - 1876 ft (571 80 m)  F - 1765 ft (537 98 m)  70-79: M - 1729 ft (527 00 m)  F - 1545 ft (470 92 m)  80-89: M - 1368 ft (416 97 m)  F - 1286 ft (391 97 m) mCTSIB  Norm: 20-60 yrs  Eyes open firm: norm sway 0 21-0 48  Eyes closed firm: norm sway 0 48-0 99  Eyes open foam: norm sway 0 38-0 71  Eyes closed foam: norm sway 0 70-2 22       Precautions: hx vertigo; hx falls; hx R wrist fx  SOC: 5/11/2021  FOTO: 5/11/2021  POC expiration: 8/3/2021  Daily Treatment Log  Date 5/27/21       Visit 6       Manual                                        Ther Ex        Supine piriformis stretch        ham stretch w/SOS        Bridges        Prone on elbows B/L knee flexion         Standing ITB stretch on wall R/L 20"x2       Standing hip abd        Supine hip flexor stretch off table        HEP updated       Ther Activity        Sit to stand 1x5 chair        Side step w/ TB 8'x4 RTB ankles                               NM Re Ed 10'       Tandem balance CGA R/L 2x10" w/o rail       SLS  R/L 2x10" w/o rail       FT EC on foam CGA EO 30"  EC 10"       Alt tap ups to step 4'' 1x10 w/o rail       Anibal R SL        Standing SLR 3 ways        Stepping over hurdles                Modalities                                           Assessment and Treatment was performed by YOVANNY Stoddard under direct supervision and input of KARL Hrenadez, ALLI  Access Code: [email or web address]  URL: [email or web address]  Date: 05/11/2021  Prepared by:  Fletcher Baker    Exercises  Supine Figure 4 Piriformis Stretch - 1 x daily - 7 x weekly - 4 reps - 20 hold  Supine Hamstring Stretch with Strap - 1 x daily - 7 x weekly - 4 reps - 20 hold  Supine Bridge - 1 x daily - 7 x weekly - 2 sets - 10 reps  Static Prone on Elbows - 1 x daily - 7 x weekly - 2 sets - 10 reps  ITB Stretch at Wall - 1 x daily - 7 x weekly - 3 reps - 20 hold  Standing Romberg to 3/4 Tandem Stance - 1 x daily - 7 x weekly - 3 sets - 30 hold  Single Leg Stance with Support - 1 x daily - 7 x weekly - 3 sets - 30 hold

## 2021-06-01 ENCOUNTER — OFFICE VISIT (OUTPATIENT)
Dept: PHYSICAL THERAPY | Facility: CLINIC | Age: 81
End: 2021-06-01
Payer: MEDICARE

## 2021-06-01 DIAGNOSIS — H81.11 BPPV (BENIGN PAROXYSMAL POSITIONAL VERTIGO), RIGHT: Primary | ICD-10-CM

## 2021-06-01 DIAGNOSIS — R26.89 IMBALANCE: ICD-10-CM

## 2021-06-01 PROCEDURE — 97112 NEUROMUSCULAR REEDUCATION: CPT | Performed by: PHYSICAL THERAPIST

## 2021-06-01 NOTE — PROGRESS NOTES
Daily Note     Today's date: 2021  Patient name: Dante Matthew  : 1940  MRN: [id number]  Referring provider: Lakesha Guzman MD  Dx:   Encounter Diagnosis     ICD-10-CM    1  BPPV (benign paroxysmal positional vertigo), right  H81 11    2  Imbalance  R26 89                   Subjective: Pt c/o hamstring tightness from moving stones over the weekend  She reports she is able to complete her HEP  Pt has no c/o BPPV (dizziness) symptoms  Objective: See treatment diary below      Assessment: Tolerated treatment well  Pt was challenged w/cognitive distractions added to her balance activities, and she states standing on the foam is her biggest challenge  Pt had no onset of dizziness symptoms during session  Patient demonstrated fatigue post treatment and would benefit from continued PT to inc her balance and independence with managing vestibular dysfunction  Plan: Continue per plan of care        SOC: 2021  FOTO: 2021  POC expiration: 8/3/2021  Daily Treatment Log  Date 21      Visit 6 7      Manual                                        Ther Ex        Supine piriformis stretch        ham stretch w/SOS        Bridges        Prone on elbows B/L knee flexion         ITB stretch on wall        Supine hip flexor stretch off table        HEP updated       Ther Activity        Sit to stand 1x5 chair        Side step w/ TB 8'x4 RTB ankles                               NM Re Ed 10' 45'      Tandem balance CGA R/L 2x10" w/o rail R/L 2x10" w/o rail w/ EO       SLS CGA R/L 2x10" w/o rail R/L 20"x2 w/o rail on foam      FT EC on foam CGA EO 30"  EC 10" 30"x2      Alt tap ups to cone on step 4'' 1x10 w/o rail 6" standing on foam w/o rail, 2x10 R/L      Tracking/pursuits w/  ambulation w/ball cw/ccw  8''x4 w/cognitive distraction      VOR cancel (up/down) amblation w/ball tracking  8''x4 w/cognitive distraction      VOR cancel (R to L) ambulation w/ball tracking  8''x4 w/cognitive distraction Hurdles FWD and sidestep CGA  5 hurdles 4x ea w/ cognitive distraction      Catch/throw ball w/ PT CGA  10x w/cognitive distraction FT On foam      Throw/catch ball 1' above head CGA  Walking 10'x2              Modalities                                               Treatment was performed by YOVANNY Perez under direct supervision of KASIA Kinney, cert MDT  Access Code: [email or web address]  URL: [email or web address]  [email or web address]  Date: 05/11/2021  Prepared by:  Ankit Summers    Exercises  Supine Figure 4 Piriformis Stretch - 1 x daily - 7 x weekly - 4 reps - 20 hold  Supine Hamstring Stretch with Strap - 1 x daily - 7 x weekly - 4 reps - 20 hold  Supine Bridge - 1 x daily - 7 x weekly - 2 sets - 10 reps  Static Prone on Elbows - 1 x daily - 7 x weekly - 2 sets - 10 reps  ITB Stretch at Wall - 1 x daily - 7 x weekly - 3 reps - 20 hold

## 2021-06-02 ENCOUNTER — TRANSCRIBE ORDERS (OUTPATIENT)
Dept: PHYSICAL THERAPY | Facility: CLINIC | Age: 81
End: 2021-06-02

## 2021-06-02 ENCOUNTER — OFFICE VISIT (OUTPATIENT)
Dept: PHYSICAL THERAPY | Facility: CLINIC | Age: 81
End: 2021-06-02
Payer: MEDICARE

## 2021-06-02 DIAGNOSIS — M70.62 GREATER TROCHANTERIC BURSITIS OF LEFT HIP: ICD-10-CM

## 2021-06-02 DIAGNOSIS — H81.11 BPPV (BENIGN PAROXYSMAL POSITIONAL VERTIGO), RIGHT: Primary | ICD-10-CM

## 2021-06-02 DIAGNOSIS — R26.89 IMBALANCE: ICD-10-CM

## 2021-06-02 DIAGNOSIS — M25.552 PAIN IN LEFT HIP: ICD-10-CM

## 2021-06-02 PROCEDURE — 97112 NEUROMUSCULAR REEDUCATION: CPT

## 2021-06-02 NOTE — PROGRESS NOTES
Daily Note     Today's date: 2021  Patient name: Kelvin López  : 1940  MRN: [id number]  Referring provider: Michelle Johns MD  Dx:   Encounter Diagnosis     ICD-10-CM    1  BPPV (benign paroxysmal positional vertigo), right  H81 11    2  Imbalance  R26 89                   Subjective: pt reports her c/c is her hamstring tightness, it is getting better but remains sore  No reports of any episodes of dizziness       Objective: See treatment diary below      Assessment: Tolerated treatment well  Pt challenged with all balance activities today require CGA  Pt with appropriate hip, ankle, stepping balance strategies when there was LOB  Pt requires seated rest breaks due to feeling SOB/overheated  Patient would benefit from continued PT      Plan: Continue per plan of care        SOC: 2021  FOTO: 2021  POC expiration: 8/3/2021  Daily Treatment Log  Date 21     Visit 6 7 8      Manual                                        Ther Ex        Supine piriformis stretch        ham stretch w/SOS   Standing heel on step w/ hip hinge   10"x5 R/L      Bridges        Prone on elbows B/L knee flexion         ITB stretch on wall        Supine hip flexor stretch off table        HEP updated       Ther Activity        Sit to stand 1x5 chair        Side step w/ TB 8'x4 RTB ankles                               NM Re Ed 10' 45' 45'      Tandem balance CGA R/L 2x10" w/o rail R/L 2x10" w/o rail w/ EO  15"x3 R/L      SLS CGA R/L 2x10" w/o rail R/L 20"x2 w/o rail on foam On foam 10"x2 R/L      FT EC on foam CGA EO 30"  EC 10" 30"x2 30"x2      FSU w/ SLS onto biodex foam   1x10 R/L      Fwd amb on foam BB    4 laps near rail      Side stepping with cone taps on foam BB    8 cones 2 laps      Alt tap ups to cone on step 4'' 1x10 w/o rail 6" standing on foam w/o rail, 2x10 R/L      Tracking/pursuits w/  ambulation w/ball cw/ccw  8''x4 w/cognitive distraction      VOR cancel (up/down) amblation w/ball tracking 8''x4 w/cognitive distraction      VOR cancel (R to L) ambulation w/ball tracking  8''x4 w/cognitive distraction      Hurdles FWD and sidestep CGA  5 hurdles 4x ea w/ cognitive distraction      Catch/throw ball w/ PT CGA  10x w/cognitive distraction FT FT on foam 2x10 CGA      Throw/catch ball 1' above head CGA  Walking 10'x2              Modalities                                               Access Code: [email or web address]  URL: [email or web address]  Date: 05/11/2021  Prepared by:  Madilyn Boast    Exercises  Supine Figure 4 Piriformis Stretch - 1 x daily - 7 x weekly - 4 reps - 20 hold  Supine Hamstring Stretch with Strap - 1 x daily - 7 x weekly - 4 reps - 20 hold  Supine Bridge - 1 x daily - 7 x weekly - 2 sets - 10 reps  Static Prone on Elbows - 1 x daily - 7 x weekly - 2 sets - 10 reps  ITB Stretch at Wall - 1 x daily - 7 x weekly - 3 reps - 20 hold

## 2021-06-08 ENCOUNTER — OFFICE VISIT (OUTPATIENT)
Dept: PHYSICAL THERAPY | Facility: CLINIC | Age: 81
End: 2021-06-08
Payer: MEDICARE

## 2021-06-08 DIAGNOSIS — R26.89 IMBALANCE: ICD-10-CM

## 2021-06-08 DIAGNOSIS — H81.11 BPPV (BENIGN PAROXYSMAL POSITIONAL VERTIGO), RIGHT: Primary | ICD-10-CM

## 2021-06-08 PROCEDURE — 97112 NEUROMUSCULAR REEDUCATION: CPT | Performed by: PHYSICAL THERAPIST

## 2021-06-08 PROCEDURE — 97110 THERAPEUTIC EXERCISES: CPT | Performed by: PHYSICAL THERAPIST

## 2021-06-08 NOTE — PROGRESS NOTES
Daily Note     Today's date: 2021  Patient name: Melyssa Salinas  : 1940  MRN: [id number]  Referring provider: Ho Mackay MD  Dx:   Encounter Diagnosis     ICD-10-CM    1  BPPV (benign paroxysmal positional vertigo), right  H81 11    2  Imbalance  R26 89                   Subjective: Pt reports she believes PT is helping her feel better  Pt states she has "twinge" in her neck to L side of C5/6 that goes away once she relaxes for a while  Pt reports L ant knee soreness/ache w/ hamstring stretch in supine w/ SOS  She cannot recall any functional limitations at this time  Objective: See treatment diary below      Assessment: Tolerated treatment well  Pt was challenged w/ SLS and tandem cone activities and WB balance, but no pain or dizziness reported  Patient demonstrated fatigue post treatment, exhibited good technique with therapeutic exercises and would benefit from continued PT  Plan: Continue per plan of care  Update HEP next visit        SOC: 2021  FOTO: 2021  POC expiration: 8/3/2021  Daily Treatment Log  Date 21  FOTO 2021    Visit 6 7 8  9    Manual                        Ther Ex    5'    Supine piriformis stretch    30" R/L supine    ham stretch w/SOS   Standing heel on step w/ hip hinge   10"x5 R/L  30" R/L supine    Bridges        Prone on elbows B/L knee flexion         ITB stretch on wall        Supine hip flexor stretch off table        HEP updated       Ther Activity    15'    Sit to stand 1x5 chair    2x10 chair     Side step w/ TB 8'x4 RTB ankles   8'x4 BTB at ankles    Diag walking     8'x4 BTB at ankles                    NM Re Ed 10' 45' 45'  35'    Tandem balance CGA R/L 2x10" w/o rail R/L 2x10" w/o rail w/ EO  15"x3 R/L      SLS CGA R/L 2x10" w/o rail R/L 20"x2 w/o rail on foam On foam 10"x2 R/L  On floor 20"x2 R/L    FT EC on foam CGA EO 30"  EC 10" 30"x2 30"x2  30"x2    FSU w/ SLS onto biodex foam   1x10 R/L      Fwd amb on foam BB    4 laps near rail      Side stepping with cone taps on foam BB    8 cones 2 laps      Tandem stance 5 cone transfer hi cart to low table    R/L 2x transfer each way   Linda    SLS  cone from plinth w/ fwd reach    1x5 R/L w/ Linda    Alt tap ups to cone on step 4'' 1x10 w/o rail 6" standing on foam w/o rail, 2x10 R/L      Tracking/pursuits w/  ambulation w/ball cw/ccw  8'x4 w/cognitive distraction  8'x4 w/ cognition    VOR cancel (up/down) amblation w/ball tracking  8'x4 w/cognitive distraction      VOR cancel (R to L) ambulation w/ball tracking  8'x4 w/cognitive distraction      Hurdles FWD and sidestep CGA  5 hurdles 4x ea w/ cognitive distraction      Catch/throw ball w/ PT CGA  10x w/cognitive distraction FT FT on foam 2x10 CGA      Throw/catch ball 1' above head CGA  Walking 10'x2      WB AP/ML    1' hold ea, 1x10 R/L ea CS    Modalities                       Access Code: [email or web address]  URL: [email or web address]  Date: 05/11/2021  Prepared by: Jhon Venegas    Exercises  Supine Figure 4 Piriformis Stretch - 1 x daily - 7 x weekly - 4 reps - 20 hold  Supine Hamstring Stretch with Strap - 1 x daily - 7 x weekly - 4 reps - 20 hold  Supine Bridge - 1 x daily - 7 x weekly - 2 sets - 10 reps  Static Prone on Elbows - 1 x daily - 7 x weekly - 2 sets - 10 reps  ITB Stretch at Wall - 1 x daily - 7 x weekly - 3 reps - 20 hold      Treatment was performed by Kristofer Charlton SPT under direct supervision of Jhon Venegas, MPT, cert MDT

## 2021-06-10 ENCOUNTER — OFFICE VISIT (OUTPATIENT)
Dept: PHYSICAL THERAPY | Facility: CLINIC | Age: 81
End: 2021-06-10
Payer: MEDICARE

## 2021-06-10 DIAGNOSIS — H81.11 BPPV (BENIGN PAROXYSMAL POSITIONAL VERTIGO), RIGHT: Primary | ICD-10-CM

## 2021-06-10 DIAGNOSIS — R26.89 IMBALANCE: ICD-10-CM

## 2021-06-10 PROCEDURE — 97110 THERAPEUTIC EXERCISES: CPT | Performed by: PHYSICAL THERAPIST

## 2021-06-10 PROCEDURE — 97112 NEUROMUSCULAR REEDUCATION: CPT | Performed by: PHYSICAL THERAPIST

## 2021-06-10 NOTE — PROGRESS NOTES
Daily Note     Today's date: 6/10/2021  Patient name: Kelvin López  : 1940  MRN: [id number]  Referring provider: Michelle Johns MD  Dx:   Encounter Diagnosis     ICD-10-CM    1  BPPV (benign paroxysmal positional vertigo), right  H81 11    2  Imbalance  R26 89                   Subjective: Pt reports she has no current limitations  Pt reports she has been compliant w/ the HEP for her hip bursitis, but does not work on her balance exercises as frequently  Objective: See treatment diary below; updated HEP      Assessment: Tolerated treatment well  Pt has most difficulty w/ transferring cones in tandem stance, but has been making improvements in balance  Pt was told a more extensive HEP could be implemented and a D/C could be done next visit  Can use next visit to f/u w/ HEP progress  Patient exhibited good understanding of her updated HEP and agreed to POC  Plan: Potential discharge next visit       SOC: 2021  FOTO: 2021  POC expiration: 8/3/2021  Daily Treatment Log  Date 21  FOTO 2021 2021 6/10/2021   Visit 6 7 8  9 10   Manual                        Ther Ex    5' 15'   Supine piriformis stretch    30" R/L supine    ham stretch w/SOS   Standing heel on step w/ hip hinge   10"x5 R/L  30" R/L supine    Bridges        Prone on elbows B/L knee flexion         ITB stretch on wall        Supine hip flexor stretch off table        HEP updated    Updated/ reviewed 13'   Ther Activity    15'    Sit to stand 1x5 chair    2x10 chair     Side step w/ TB 8'x4 RTB ankles   8'x4 BTB at ankles    Diag walking     8'x4 BTB at ankles                    NM Re Ed 10' 45' 45'  35' 30'   Tandem balance CGA R/L 2x10" w/o rail R/L 2x10" w/o rail w/ EO  15"x3 R/L   30"x2 R/L   SLS CGA R/L 2x10" w/o rail R/L 20"x2 w/o rail on foam On foam 10"x2 R/L  On floor 20"x2 R/L On floor 30"x2 R/L   FT EC on foam CGA EO 30"  EC 10" 30"x2 30"x2  30"x2 On floor 30"x2   FSU w/ SLS onto biodex foam   1x10 R/L   6" + foam 1x10 R/L   Fwd amb on foam BB    4 laps near rail      Side stepping with cone taps on foam BB    8 cones 2 laps      Tandem stance 5 cone transfer hi cart to low table    R/L 2x transfer each way   Matthieu R/L 2x transfer each way Matthieu   SLS  cone from plinth w/ fwd reach    1x5 R/L w/ Matthieu 1x5 R/L Matthieu   Alt tap ups to cone on step 4'' 1x10 w/o rail 6" standing on foam w/o rail, 2x10 R/L      Tracking/pursuits w/  ambulation w/ball cw/ccw  8'x4 w/cognitive distraction  8'x4 w/ cognition 8'x2    VOR cancel (up/down) amblation w/ball tracking  8'x4 w/cognitive distraction   Holding B sticky note 10x no walking   VOR cancel (R to L) ambulation w/ball tracking  8'x4 w/cognitive distraction   Holding "B" sticky note 10x no walking   Hurdles FWD and sidestep CGA  5 hurdles 4x ea w/ cognitive distraction      Catch/throw ball w/ PT CGA  10x w/cognitive distraction FT FT on foam 2x10 CGA      Throw/catch ball 1' above head CGA  Walking 10'x2      WB AP/ML    1' hold ea, 1x10 R/L ea CS 1' hold ea CS   Modalities                     Treatment was performed by Severiano Overlie, SPT under direct supervision of Eleni Johnston MPT, cert MDT  Access Code: [email or web address]  URL: [email or web address]  Date: 06/10/2021  Prepared by:  Eleni Johnston    Exercises  Single Leg Stance with Support - 1 x daily - 7 x weekly - 3 sets - 30 hold  Standing Gaze Stabilization with Head Rotation and Horizontal Arm Movement - 1 x daily - 7 x weekly - 2 sets - 10 reps  Standing Gaze Stabilization with Head Nod and Vertical Arm Movement - 1 x daily - 7 x weekly - 2 sets - 10 reps  Tandem Walking with Counter Support - 1 x daily - 7 x weekly - 2 sets - 10 reps

## 2021-06-15 ENCOUNTER — OFFICE VISIT (OUTPATIENT)
Dept: PHYSICAL THERAPY | Facility: CLINIC | Age: 81
End: 2021-06-15
Payer: MEDICARE

## 2021-06-15 DIAGNOSIS — H81.11 BPPV (BENIGN PAROXYSMAL POSITIONAL VERTIGO), RIGHT: Primary | ICD-10-CM

## 2021-06-15 DIAGNOSIS — R26.89 IMBALANCE: ICD-10-CM

## 2021-06-15 PROCEDURE — 97112 NEUROMUSCULAR REEDUCATION: CPT | Performed by: PHYSICAL THERAPIST

## 2021-06-15 NOTE — PROGRESS NOTES
PT Re-Evaluation  and PT Discharge    Today's date: 6/15/2021  Patient name: Jeri Flor  : 1940  MRN: [id number]  Referring provider: Marco Pitts MD  Dx:   Encounter Diagnosis     ICD-10-CM    1  BPPV (benign paroxysmal positional vertigo), right  H81 11    2  Imbalance  R26 89                   Assessment  Assessment details: 2021: Jeri Flor is a 78 y o  female who presents with pain, decreased strength, limited flexibility, postural dysfunction, limited balance and tenderness to direct pressure/palpation  Due to these impairments, patient has difficulty performing ADL's, transfers  Patient's clinical presentation is consistent with their referring diagnosis of Greater trochanteric bursitis of left hip and Pain in left hip  Lumbar mechanical assessment does not alter symptoms which are reported as pain only w/ L side lying  She reports pain reproduction with direct palpation to greater trochante and gluts  Plan: Ambulatory referral to Physical Therapy  Patient has been educated in home exercise program and plan of care  Patient would benefit from skilled physical therapy services to address their aforementioned functional limitations and progress towards prior level of function and independence with home exercise program      2021: Pt can now lay on her L side through the night with mild pain the following morning  Pt was directed to try completing her HEP in the morning instead of at night and see if her pain is resolved  Pt still has limited L side flexibility in hams and hip flexors, but is compliant w/ HEP to stretch them  Pt scored 45/56 on WITT, placing her at increased risk of falls and has most difficulty w/ SLS and tandem stance  Poor ability to perform tandem walking with full tandem achieved, with <2 steps consecutively completed  Pt has mod sway w/ EC balance activities  She reported mild dizziness when sitting up after Epley maneuver but without nystagmus   Pt would benefit from continued skilled physical therapy to address her balance limitations and management of BPPV as appropriate  6/15/2021: Pt reports no c/o dizziness and feels steady w/ ambulation, stairs, transfers and bed mobility  Pt reports that neither her hip nor BPPV are limiting her daily life  Pt has improved her WITT score to 51/56, which indicates she not at risk for falls  Her TUG and 5xSTS scores reflect she is not at risk for falls as well  Pt still has difficulty w/ tandem stance, tandem walking and SLS B/L, but they are included in her HEP so she may continue improving  HEP was reviewed to ensure proper technique  Pt was directed that she does not have to continue exercises for her hip, but she can if desired  Pt D/C today w/ HEP and was told she may return if symptoms occur again  Other impairment: limited balance     Symptom irritability: lowUnderstanding of Dx/Px/POC: good   Prognosis: good    Goals  Short Term Goals: to be completed in 4 weeks (6/8/21)  1  Pt will have decreased pain to 0-3/10 when in L side lie to inc comfort when sleeping  - MET  2  Pt to be able to perform SLS on R/L side for 15 sec w/EO to improve balance  - NOT MET/PROGRESSING  3  Pt will be I with HEP  - MET  4  Pt to inc R/L hip abd to 5/5 to inc tolerance in SLS  - MET  NEW GOAL 5/27/21: Pt to improve tandem balance B/L to 15 sec w/ EO  - MET    Long Term Goals: to be completed in 12 weeks (8/3/21)  1  Pt will have 0/10 pain when in L SL to be able to sleep through the night on L side  - MET  2  Pt to inc FOTO score by at least 15 points to improve function with household work  - MET  3  Pt to inc hip ext strength B/L to 5/5 to improve walking tolerance  - NOT MET  4  Pt to inc R/L hip IR ROM by 10 degrees to improve ability to perform self care  - NOT MET  5   Pt to be able to perform SLS on R/L side for 30 sec w/EO to reduce risk of falls  - NOT MET  NEW GOAL 5/27/21: Pt to inc WITT score by 5 points or more to improve her ability to walk on uneven surfaces  - MET  NEW GOAL 21: Pt to be I in HEP for balance and BPPV if needed  - MET    Plan  Plan of Care beginning date: 2021  Plan of Care expiration date: 2021  Treatment plan discussed with: patient        Subjective Evaluation    History of Present Illness  Date of onset: 3/1/2021  Mechanism of injury: Subjective 21: Pt has PMH of vertigo and a fall 10 years ago  In beginning of 2021, pt had L ear pain and visited PCP, who told to her sleep on L side  About two weeks later, pts L hip started hurting  She began avoiding sleeping on L side ever since  Pt slipped and fell [place] a bowling alley on L hip 10 years ago, but is not sure this is the cause because there was no pain until recently  21: Pt states she can lay on her L side comfortably through the night now, and just has mild pain when she wakes up which may be due to completing her HEP at night  Pt has been previously diagnosed with R BPPV and stated she would now like to work primarily on this and her balance as her hip has mostly resolved  Pt reports previous treatment for vertigo by her physician but states she was not compliant w/ HEP  She states she has the most difficulty w/ laying down for bed with onset of dizziness with lying down, sitting up  6/15/2021: Pt reports no c/o dizziness  Pt states she feels fine w/ ambulation, stairs, transfers and bed mobility  However, she is reluctant to attempt hiking/walks on uneven ground which she states she has previously done on vacation  Pt reports she is compliant w/ HEP, but stated she is not sure if she is doing VOR cancel exercises correctly  Pt asked if she could continue HEP for her hip             Not a recurrent problem   Quality of life: good    Pain  Current pain ratin  At best pain ratin  Progression: improved    Treatments  Previous treatment: physical therapy  Current treatment: physical therapy  Patient Goals  Patient goals for therapy: improved balance and increased strength  Patient's goals regarding treatment: Decrease pain with lying on L side, improve balance  Objective  A/PROM:  L  R  L     21  Hip ER prone  45P  45P  45P   Hip IR prone  25P  25P  25P    MMT:    L  R  R  L     21  Hip flex   NT  NT  5/5  5/5  Hip abd  5/5  5/5  5/5  4+/5  Hip ER   NT  NT  5/5  5/5  Hip IR   NT  NT  5/5  5/5  Hip add  4+/5  4+/5  NT  NT  Hip ext W/knee ext 4+/5  4+/5  3+/5  3+/5  Knee flex  5/5  NT  5/5  5/5*  Knee ext  NT  NT  5/5  5/5    Lumbar screen: Lumbar AROM limitation     21   Flexion:  mod  Mod    Extension:  mod  alexsandra   R Side glide:  mod  mod   L Side glide: mod  mod    Mechanical Assessment: pre-test symptoms include pain w/ L SL         21  Repeated Ext in Lying (REIL)x10 NE    Balance:   6/15/2021 5/27/21 5/11/21 2021     EO  EO  EO  EC  Tandem R post 15 sec  7 sec  14 sec  NT  Tandem L post 13 sec  5 sec  3 sec  NT  SLS R   3 sec  5 sec  3 sec  NT  SLS L    5 sec  3 sec  3 sec  NT    Reflexes: patellar and achilles normal     Nerve Testin21- L sciatic nerve tightness w/ 45 deg hip flexion and adduction    Function: 21- sleep is no longer disturbed, but pt feels sore in the morning  21- sleep is disturbed when pt is SL on L and relieved once moved out of this position    Palpation: 21- L GT no longer TTP  21- tender at L GT, sore at L iliac crest, sensitive at L ITB, no tenderness posteriorly    Flexibility: 21- still has L side limitations stated below  21- L hamstring tightness, L hip flexor end range tightness  R side hip flexor is minimally limited compared to L    BPPV test: 21- Performed Epley maneuver with min-no dizziness noted with lying down, no nystagmus, and mild dizziness reported with return to sitting lasting <5 seconds and without noted nystagmus  Will re-assess at next session        Outcome Measures Initial Assess  6/15/2021 RE  6/15/2021       5xSTS NT 13 91 sec        TUG NT 12 99 sec       10 meter NT        WITT 45/56 51/56       FGA NT/30        DGI NT/24        mCTSIB  - FTEO (firm)  - FTEC (firm)  - FTEO (foam)    - FTEC (foam)   30+ sec  30+ sec  30+ sec mod sway  10 sec-mod sway, limiting tolerance   30+ sec  30+ sec  30+ sec     30+ sec mod sway       6MWT NT ft        Tandem Gait Significant difficulty with pt unable to complete >2 steps consecutively Pt can take 10 steps consecutively CS but foot is not directly in front                  Dynamic Visual Acuity attempted, with pt reading line 7 @10 feet, with poor ability to turn head at appropriate speed to complete dynamic portion of test  Patient noted to slow/stop turning head to read each letter  Cut off scores: All data taken from [facility] or Rehab Measures    Witt: <46/56=falls risk  MDC: 6 pts  Age Norms:  61-76: M - 54   F - 55  70-79: M - 47   F - 53  80-89: M - 48   F - 50 5xSTS: Osbaldo et al 2010  MDC: 2 3 sec  Age Norms:  60-69: 11 1 sec  70-79: 12 6 sec  80-89: 14 8 sec   TUG  MDC: 4 14 sec  Cut off score:  >13 5 sec community dwelling adults  >32 2 sec frail elderly  <20 sec:  I for basic transfers  >30: dependent for transfers 10 Meter Walk Test Norms: Ivan Figueredo and Alice Radforder al 2011  20-29: M - 1 35 m   F - 1 34 m  30-39: M - 1 43 m   F - 1 34 m  40-49: M - 1 43 m   F - 1 39 m  50-59: M - 1 43 m   F - 1 31 m  60-69: M - 1 34 m   F - 1 24 m  70-79: M - 1 26 m   F - 1 13 m  80-89: M - 0 97 m   F - 0 94 m   FGA  MCID: 4 pts  Geriatrics/community < 22/30 fall risk  Geriatrics/community < 20/30 unexplained falls DGI  MDC: vestibular - 4 pts  MDC: geriatric/community - 3 pts  Falls risk <19/24   6 Minute Walk Test  Age Norms  61-76: M - 1876 ft (571 80 m)  F - 1765 ft (537 98 m)  70-79: M - 1729 ft (527 00 m)  F - 1545 ft (470 92 m)  80-89: M - 1368 ft (416 97 m)  F - 1286 ft (391 97 m) mCTSIB  Norm: 20-60 yrs  Eyes open firm: norm sway 0  21-0 48  Eyes closed firm: norm sway 0 48-0 99  Eyes open foam: norm sway 0 38-0 71  Eyes closed foam: norm sway 0 70-2 22       Precautions: hx vertigo; hx falls; hx R wrist fx  SOC: 5/11/2021  FOTO: 6/15/2021  RE: 6/15/2021  POC expiration: 8/3/2021  Daily Treatment Log  Date 6/15/2021  RE/FOTO       Visit 11       Manual                        Ther Ex        Supine piriformis stretch        HS stretch        Bridges        Prone on elbows B/L knee flexion         ITB stretch on wall        Supine hip flexor stretch off table                Ther Activity        Sit to stand 1x5 chair        Side step w/ TB        Diag walking                         NM Re Ed 45'       Tandem balance CGA R/L 5x10" w/o rail       SLS CGA R/L 5x10" w/o rail       FT EC on foam CGA EO 30"  EC 30"       FSU w/ SLS onto foam        Fwd amb on foam BB         Tandem stance 5 cone transfer hi cart to low table Mtathieu        SLS  cone         Alt tap ups to 6" step 1x4 R/L w/o rail CGA       Tracking/pursuits w/  ambulation w/ball cw/ccw        VOR cancel (up/down)  tracking Holding "B" sticky note 10x ea       VOR cancel (R to L)  tracking Holding "B" sticky note 10x ea       Hurdles FWD and sidestep CGA        Throw/catch ball 1' above head CGA        WB AP/ML        HEP Reviewed 15'       Modalities                   Access Code: [email or web address]  URL: [email or web address]  [email or web address]  Date: 06/10/2021  Prepared by: Arlis Pallas    Exercises  Single Leg Stance with Support - 1 x daily - 7 x weekly - 3 sets - 30 hold  Standing Gaze Stabilization with Head Rotation and Horizontal Arm Movement - 1 x daily - 7 x weekly - 2 sets - 10 reps  Standing Gaze Stabilization with Head Nod and Vertical Arm Movement - 1 x daily - 7 x weekly - 2 sets - 10 reps  Tandem Walking with Counter Support - 1 x daily - 7 x weekly - 2 sets - 10 reps      Treatment was performed by YOVANNY Rose under direct supervision of Arlis Pallas, MPT, cert MDT

## 2021-06-17 ENCOUNTER — APPOINTMENT (OUTPATIENT)
Dept: PHYSICAL THERAPY | Facility: CLINIC | Age: 81
End: 2021-06-17
Payer: MEDICARE

## 2021-06-22 ENCOUNTER — APPOINTMENT (OUTPATIENT)
Dept: PHYSICAL THERAPY | Facility: CLINIC | Age: 81
End: 2021-06-22
Payer: MEDICARE

## 2021-06-24 ENCOUNTER — APPOINTMENT (OUTPATIENT)
Dept: PHYSICAL THERAPY | Facility: CLINIC | Age: 81
End: 2021-06-24
Payer: MEDICARE

## 2021-06-29 ENCOUNTER — APPOINTMENT (OUTPATIENT)
Dept: PHYSICAL THERAPY | Facility: CLINIC | Age: 81
End: 2021-06-29
Payer: MEDICARE

## 2021-07-01 ENCOUNTER — APPOINTMENT (OUTPATIENT)
Dept: PHYSICAL THERAPY | Facility: CLINIC | Age: 81
End: 2021-07-01
Payer: MEDICARE

## 2021-07-09 ENCOUNTER — TELEPHONE (OUTPATIENT)
Dept: OBGYN CLINIC | Facility: CLINIC | Age: 81
End: 2021-07-09

## 2021-07-21 ENCOUNTER — OFFICE VISIT (OUTPATIENT)
Dept: PHYSICAL THERAPY | Facility: CLINIC | Age: 81
End: 2021-07-21
Payer: MEDICARE

## 2021-07-21 DIAGNOSIS — M54.32 SCIATICA OF LEFT SIDE: Primary | ICD-10-CM

## 2021-07-21 PROCEDURE — 97110 THERAPEUTIC EXERCISES: CPT | Performed by: PHYSICAL THERAPIST

## 2021-07-21 PROCEDURE — 97161 PT EVAL LOW COMPLEX 20 MIN: CPT | Performed by: PHYSICAL THERAPIST

## 2021-07-21 NOTE — PROGRESS NOTES
PT Evaluation     Today's date: 2021  Patient name: Milind Clark  : 1940  MRN: [id number]  Referring provider: Self, Referral  Dx:   Encounter Diagnosis     ICD-10-CM    1  Sciatica of left side  M54 32        Start Time: 1150  Stop Time: 1006  Total time in clinic (min): 45 minutes    Assessment  Assessment details: 2021: Milind Clark is a 78 y o  female who presents to physical therapy on a direct acces basis with pain, decreased strength, decreased ROM, decreased joint mobility and postural  dysfunction  Due to these impairments, patient has difficulty performing ADL's, ambulation, sitting/driving and rising from sitting/driving, stair negotiation  Patient's clinical presentation is consistent with Sciatica of left side  (primary encounter diagnosis) as a result of lumbar derangement w/ directional preference of extension  Patient has been educated in home exercise program and plan of care  Patient would benefit from skilled physical therapy services to address their aforementioned functional limitations and progress towards prior level of function and independence with home exercise program and self symptom management/resolution  Impairments: abnormal or restricted ROM, activity intolerance, impaired physical strength, lacks appropriate home exercise program, pain with function, weight-bearing intolerance, poor posture  and poor body mechanics  Other impairment: pain w/ stairs  Functional limitations: limited tolerance to sitting/driving/bendingUnderstanding of Dx/Px/POC: good   Prognosis: good    Goals  Short Term Goals: Target Date 2021  1  Initiate and advance HEP toward self symptom reduction/resolution  2  Improve postural awareness and demonstrate self postural correction  3  Improve AROM lumbar spine to min baez or better t/o   4  Undisturbed sleep  5  Pt to tolerate prolonged sitting 30 min or more w/o c/o  Long Term Goals: Target Date 10/13/2021  1   Indep with HEP and self symptom prevention  2  Achieve lumbar AROM to WNL, except exten to be min baez or better w/o c/o   3  Improve LE strength to 5/5 B/L w/o pain provocation  4  Improve L quad/pirif flexibility to min baez or better to reduce symptom irritability  5  Pt to tolerate sitting/driving for an hour w/o pain  Plan  Patient would benefit from: skilled PT and PT eval  Planned modality interventions: thermotherapy: hydrocollator packs and unattended electrical stimulation  Planned therapy interventions: joint mobilization, patient education, postural training, abdominal trunk stabilization, functional ROM exercises, home exercise program, neuromuscular re-education, strengthening, stretching, therapeutic activities and therapeutic exercise  Other planned therapy interventions: mechanical assessment  Frequency: 2x week  Plan of Care beginning date: 2021  Plan of Care expiration date: 10/13/2021  Treatment plan discussed with: patient        Subjective Evaluation    History of Present Illness  Mechanism of injury: Subjective 2021: Pt reports an onset of her episodic sciatica about 2 weeks ago w/o incident  Symptoms are constant, worse with sitting  Pain  Current pain ratin  At best pain ratin  At worst pain ratin  Pain location: L low back, buttock, posterior thigh above knee  Quality: sharp (numb type feeling)  Aggravating factors: sitting (driving)  Progression: worsening      Diagnostic Tests  No diagnostic tests performed  Treatments  Treatments tried: chiro with prior episodes, not this one  Current treatment: physical therapy  Patient Goals  Patient goals for therapy: decreased pain  Patient goal: improve sitting tolerance        Objective    Flowsheet Rows      Most Recent Value   PT/OT G-Codes   Current Score  44   Projected Score  63       Posture: Lumbar lordosis is reduced in standing  There is no lateral shift  In siting, lumbar roll improves comfort      Lumbar AROM limitations: (* =  Pain)  Lumbar flexion: Min*  Lumbar extension: Mod/alexsandra  R side glide:  min  L side glide:  Min*    Mechanical Asessment: pre-test symtpoms include pain w/ MMT L LE; pain w/ lumbar AROM  Repeated Exten in Standing (BRITTANY) 3x10: NE  LUCILA w/ B/L knee flexion 2x10: Dec/B    Strength:     Right  Left     7/21/2021 7/21/2021  Hip flexion:  5/5  4+/5*  Knee ext  5/5  5/5  Ankle DF  5/5  5/5  Great toe ext  5/5  5/5  Ankle PF  5/5  5/5  Knee flex  5/5  4/5*      Hip abduction  5/5  5/5  Hip external rotat 5/5  5/5*  Hip internal rotat 5/5  4+/5*  Hip extension  4+/5  4/5    Sensation: reported WNL B/L LEs     Flexibility: WFL B/L hams, mod baez L pirif and quads vs min baez R;    Function: pain w/ stairs, prolonged sitting/driving    DTR's: WNL B/L L4 and S1    Palpation: no TTP L piriformis; post to anterior lumbar mobility is poor w/o pain  Precautions:   Past Medical History:   Diagnosis Date    Hypertension        SOC: 7/21/2021  FOTO: 7/21/2021  POC Expiration: 10/13/2021  Daily Treatment Log:  Date 7/21/2021       Visit #        Manual                        There Exer         LUCILA w/ B/L knee flexion 2x10       Supine fig 4 Pirif str - slide bottem leg closer 1'x3       Sciatic NG w/ SOS 20x R/L                                       HEP        There Activ                                                        NMReed                                                Modalities                                Access Code: [email or web address]  URL: [employer]  [email or web address]/  Date: 07/21/2021  Prepared by:  Kesha Ortiz    Exercises  Static Prone on Elbows - 1 x daily - 7 x weekly - 3 sets - 10 reps  Supine Sciatic Nerve Glide - 1 x daily - 7 x weekly - 1 sets - 20 reps  Supine Hip External Rotation Stretch - 1 x daily - 7 x weekly - 1 sets - 3 reps - 1 min hold

## 2021-07-21 NOTE — LETTER
2021    MD [phone]    Patient: Jenni Coats   YOB: 1940   Date of Visit: 2021     Encounter Diagnosis     ICD-10-CM    1  Sciatica of left side  M54 32        Dear Dr Samuel Hilario:    Thank you for your recent referral of Jenni Coats  Please review the attached evaluation summary from Priya's recent visit  Please verify that you agree with the plan of care by signing the attached order  If you have any questions or concerns, please do not hesitate to call  I sincerely appreciate the opportunity to share in the care of one of your patients and hope to have another opportunity to work with you in the near future  Sincerely,    [place] [address], PT      Referring Provider:      I certify that I have read the below Plan of Care and certify the need for these services furnished under this plan of treatment while under my care  MD [phone]  Via Fax: [phone]          PT Evaluation     Today's date: 2021  Patient name: Jenni Coats  : 1940  MRN: [id number]  Referring provider: Self, Referral  Dx:   Encounter Diagnosis     ICD-10-CM    1  Sciatica of left side  M54 32        Start Time: 1150  Stop Time: 7836  Total time in clinic (min): 45 minutes    Assessment  Assessment details: 2021: Jenni Coats is a 78 y o  female who presents to physical therapy on a direct acces basis with pain, decreased strength, decreased ROM, decreased joint mobility and postural  dysfunction  Due to these impairments, patient has difficulty performing ADL's, ambulation, sitting/driving and rising from sitting/driving, stair negotiation  Patient's clinical presentation is consistent with Sciatica of left side  (primary encounter diagnosis) as a result of lumbar derangement w/ directional preference of extension  Patient has been educated in home exercise program and plan of care  Patient would benefit from skilled physical therapy services to address their aforementioned functional limitations and progress towards prior level of function and independence with home exercise program and self symptom management/resolution  Impairments: abnormal or restricted ROM, activity intolerance, impaired physical strength, lacks appropriate home exercise program, pain with function, weight-bearing intolerance, poor posture  and poor body mechanics  Other impairment: pain w/ stairs  Functional limitations: limited tolerance to sitting/driving/bendingUnderstanding of Dx/Px/POC: good   Prognosis: good    Goals  Short Term Goals: Target Date 8/18/2021  1  Initiate and advance HEP toward self symptom reduction/resolution  2  Improve postural awareness and demonstrate self postural correction  3  Improve AROM lumbar spine to min baez or better t/o   4  Undisturbed sleep  5  Pt to tolerate prolonged sitting 30 min or more w/o c/o  Long Term Goals: Target Date 10/13/2021  1  Indep with HEP and self symptom prevention  2  Achieve lumbar AROM to WNL, except exten to be min baez or better w/o c/o   3  Improve LE strength to 5/5 B/L w/o pain provocation  4  Improve L quad/pirif flexibility to min baez or better to reduce symptom irritability  5  Pt to tolerate sitting/driving for an hour w/o pain        Plan  Patient would benefit from: skilled PT and PT eval  Planned modality interventions: thermotherapy: hydrocollator packs and unattended electrical stimulation  Planned therapy interventions: joint mobilization, patient education, postural training, abdominal trunk stabilization, functional ROM exercises, home exercise program, neuromuscular re-education, strengthening, stretching, therapeutic activities and therapeutic exercise  Other planned therapy interventions: mechanical assessment  Frequency: 2x week  Plan of Care beginning date: 2021  Plan of Care expiration date: 10/13/2021  Treatment plan discussed with: patient        Subjective Evaluation    History of Present Illness  Mechanism of injury: Subjective 2021: Pt reports an onset of her episodic sciatica about 2 weeks ago w/o incident  Symptoms are constant, worse with sitting  Pain  Current pain ratin  At best pain ratin  At worst pain ratin  Pain location: L low back, buttock, posterior thigh above knee  Quality: sharp (numb type feeling)  Aggravating factors: sitting (driving)  Progression: worsening      Diagnostic Tests  No diagnostic tests performed  Treatments  Treatments tried: chiro with prior episodes, not this one  Current treatment: physical therapy  Patient Goals  Patient goals for therapy: decreased pain  Patient goal: improve sitting tolerance        Objective    Flowsheet Rows      Most Recent Value   PT/OT G-Codes   Current Score  44   Projected Score  63       Posture: Lumbar lordosis is reduced in standing  There is no lateral shift  In siting, lumbar roll improves comfort  Lumbar AROM limitations:  (* =  Pain)  Lumbar flexion: Min*  Lumbar extension: Mod/alexsandra  R side glide:  min  L side glide:  Min*    Mechanical Asessment: pre-test symtpoms include pain w/ MMT L LE; pain w/ lumbar AROM  Repeated Exten in Standing (BRITTANY) 3x10: NE  LUCILA w/ B/L knee flexion 2x10: Dec/B    Strength:     Right  Left     2021  Hip flexion:  5/5  4+/5*  Knee ext  5/5  5/5  Ankle DF  5/5  5/5  Great toe ext  5/5  5/5  Ankle PF  5/5  5/5  Knee flex  5/5  4/5*      Hip abduction  5/5  5/5  Hip external rotat 5/5  5/5*  Hip internal rotat 5/5  4+/5*  Hip extension  4+/5  4/5    Sensation: reported WNL B/L LEs     Flexibility: WFL B/L hams, mod baez L pirif and quads vs min baez R;    Function: pain w/ stairs, prolonged sitting/driving    DTR's: WNL B/L L4 and S1    Palpation: no TTP L piriformis; post to anterior lumbar mobility is poor w/o pain  Precautions:   Past Medical History:   Diagnosis Date    Hypertension        SOC: 7/21/2021  FOTO: 7/21/2021  POC Expiration: 10/13/2021  Daily Treatment Log:  Date 7/21/2021       Visit #        Manual                        There Exer         LUCILA w/ B/L knee flexion 2x10       Supine fig 4 Pirif str - slide bottem leg closer 1'x3       Sciatic NG w/ SOS 20x R/L                                       HEP        There Activ                                                        NMReed                                                Modalities                                Access Code: [email or web address]  URL: [email or web address]  [email or web address]  Date: 07/21/2021  Prepared by:  Kesha Ortiz    Exercises  Static Prone on Elbows - 1 x daily - 7 x weekly - 3 sets - 10 reps  Supine Sciatic Nerve Glide - 1 x daily - 7 x weekly - 1 sets - 20 reps  Supine Hip External Rotation Stretch - 1 x daily - 7 x weekly - 1 sets - 3 reps - 1 min hold

## 2021-07-21 NOTE — LETTER
2021    [phone]    Patient: Sulma Dunlap   YOB: 1940   Date of Visit: 2021     Encounter Diagnosis     ICD-10-CM    1  Sciatica of left side  M54 32        Dear Dr Branda Pallas: Thank you for your recent referral of Sulma Dunlap  Please review the attached evaluation summary from Priya's recent visit  Please verify that you agree with the plan of care by signing the attached order  If you have any questions or concerns, please do not hesitate to call  I sincerely appreciate the opportunity to share in the care of one of your patients and hope to have another opportunity to work with you in the near future  Sincerely,    Wu Martinez, PT      Referring Provider:      I certify that I have read the below Plan of Care and certify the need for these services furnished under this plan of treatment while under my care  [phone]  Via Fax: [phone]          PT Evaluation     Today's date: 2021  Patient name: Sulma Dunlap  : 1940  MRN: [id number]  Referring provider: Lokesh Garza MD  Dx:   Encounter Diagnosis     ICD-10-CM    1  Sciatica of left side  M54 32                   Assessment  Assessment details: 2021: Sulma Dunlap is a 78 y o  female who presents to physical therapy on a direct acces basis with pain, decreased strength, decreased ROM, decreased joint mobility and postural  dysfunction  Due to these impairments, patient has difficulty performing ADL's, ambulation, sitting/driving and rising from sitting/driving, stair negotiation  Patient's clinical presentation is consistent with Sciatica of left side  (primary encounter diagnosis) as a result of lumbar derangement w/ directional preference of extension     Patient has been educated in home exercise program and plan of care  Patient would benefit from skilled physical therapy services to address their aforementioned functional limitations and progress towards prior level of function and independence with home exercise program and self symptom management/resolution  Impairments: abnormal or restricted ROM, activity intolerance, impaired physical strength, lacks appropriate home exercise program, pain with function, weight-bearing intolerance, poor posture  and poor body mechanics  Other impairment: pain w/ stairs  Functional limitations: limited tolerance to sitting/driving/bendingUnderstanding of Dx/Px/POC: good   Prognosis: good    Goals  Short Term Goals: Target Date 8/18/2021  1  Initiate and advance HEP toward self symptom reduction/resolution  2  Improve postural awareness and demonstrate self postural correction  3  Improve AROM lumbar spine to min baez or better t/o   4  Undisturbed sleep  5  Pt to tolerate prolonged sitting 30 min or more w/o c/o  Long Term Goals: Target Date 10/13/2021  1  Indep with HEP and self symptom prevention  2  Achieve lumbar AROM to WNL, except exten to be min baez or better w/o c/o   3  Improve LE strength to 5/5 B/L w/o pain provocation  4  Improve L quad/pirif flexibility to min baez or better to reduce symptom irritability  5  Pt to tolerate sitting/driving for an hour w/o pain        Plan  Patient would benefit from: skilled PT and PT eval  Planned modality interventions: thermotherapy: hydrocollator packs and unattended electrical stimulation  Planned therapy interventions: joint mobilization, patient education, postural training, abdominal trunk stabilization, functional ROM exercises, home exercise program, neuromuscular re-education, strengthening, stretching, therapeutic activities and therapeutic exercise  Other planned therapy interventions: mechanical assessment  Frequency: 2x week  Plan of Care beginning date: 7/21/2021  Plan of Care expiration date: 10/13/2021  Treatment plan discussed with: patient        Subjective Evaluation    History of Present Illness  Mechanism of injury: Subjective 2021: Pt reports an onset of her episodic sciatica about 2 weeks ago w/o incident  Symptoms are constant, worse with sitting  Pain  Current pain ratin  At best pain ratin  At worst pain ratin  Pain location: L low back, buttock, posterior thigh above knee  Quality: sharp (numb type feeling)  Aggravating factors: sitting (driving)  Progression: worsening      Diagnostic Tests  No diagnostic tests performed  Treatments  Treatments tried: chiro with prior episodes, not this one  Current treatment: physical therapy  Patient Goals  Patient goals for therapy: decreased pain  Patient goal: improve sitting tolerance        Objective     Posture: Lumbar lordosis is reduced in standing  There is no lateral shift  In siting, lumbar roll improves comfort  Lumbar AROM limitations:  (* =  Pain)  Lumbar flexion: Min*  Lumbar extension: Mod/alexsandra  R side glide:  min  L side glide:  Min*    Mechanical Asessment: pre-test symtpoms include pain w/ MMT L LE; pain w/ lumbar AROM  Repeated Exten in Standing (BRITTANY) 3x10: NE  LUCILA w/ B/L knee flexion 2x10: Dec/B    Strength:     Right  Left     2021  Hip flexion:  5/5  4+/5*  Knee ext  5/5  5/5  Ankle DF  5/5  5/5  Great toe ext  5/5  5/5  Ankle PF  5/5  5/5  Knee flex  5/5  4/5*      Hip abduction  5/5  5/5  Hip external rotat 5/5  5/5*  Hip internal rotat 5/5  4+/5*  Hip extension  4+/5  4/5    Sensation: reported WNL B/L LEs     Flexibility: WFL B/L hams, mod baez L pirif and quads vs min baez R;    Function: pain w/ stairs, prolonged sitting/driving    DTR's: WNL B/L L4 and S1    Palpation: no TTP L piriformis; post to anterior lumbar mobility is poor w/o pain       Precautions:   Past Medical History:   Diagnosis Date    Hypertension        SOC: 2021  FOTO: 2021  POC Expiration: 10/13/2021  Daily Treatment Log:  Date 7/21/2021       Visit #        Manual                        There Exer         LUCILA w/ B/L knee flexion 2x10       Supine fig 4 Pirif str - slide bottem leg closer 1'x3       Sciatic NG w/ SOS 20x R/L                                       HEP        There Activ                                                        NMReed                                                Modalities                                Access Code: [email or web address]  URL: [email or web address]  [email or web address]  Date: 07/21/2021  Prepared by:  Kesha Ortiz    Exercises  Static Prone on Elbows - 1 x daily - 7 x weekly - 3 sets - 10 reps  Supine Sciatic Nerve Glide - 1 x daily - 7 x weekly - 1 sets - 20 reps  Supine Hip External Rotation Stretch - 1 x daily - 7 x weekly - 1 sets - 3 reps - 1 min hold

## 2021-07-26 ENCOUNTER — OFFICE VISIT (OUTPATIENT)
Dept: PHYSICAL THERAPY | Facility: CLINIC | Age: 81
End: 2021-07-26
Payer: MEDICARE

## 2021-07-26 DIAGNOSIS — M54.32 SCIATICA OF LEFT SIDE: Primary | ICD-10-CM

## 2021-07-26 PROCEDURE — 97112 NEUROMUSCULAR REEDUCATION: CPT

## 2021-07-26 PROCEDURE — 97110 THERAPEUTIC EXERCISES: CPT

## 2021-07-26 NOTE — PROGRESS NOTES
Daily Note     Today's date: 2021  Patient name: Korin Rendon  : 1940  MRN: [id number]  Referring provider: Self, Referral  Dx:   Encounter Diagnosis     ICD-10-CM    1  Sciatica of left side  M54 32                   Subjective: Pt reports exercises are helping  Slight pain in left buttock, no leg pain      Objective: See treatment diary below      Assessment: Tolerated treatment fair  Patient would benefit from continued PT      Plan: Continue per plan of care  Precautions:   Past Medical History:   Diagnosis Date    Hypertension        SOC: 2021  FOTO: 2021  POC Expiration: 10/13/2021  Daily Treatment Log:  Date 2021      Visit #  2      Manual                        There Exer         LUCILA w/ B/L knee flexion 2x10 2 x 10      Supine fig 4 Pirif str - slide bottem leg closer 1'x3 30 sec x 3      Sciatic NG w/ SOS 20x R/L 20 x each                                      HEP        There Activ                                                        NMReed        Bridges  2 x 10      Clam shells  Blue 2 x 10      Squats at bar   10x      Step ups  10x each 6 in              Modalities                                Access Code: [email or web address]  URL: [employer]  com/  Date: 2021  Prepared by:  Kesha Ortiz    Exercises  Static Prone on Elbows - 1 x daily - 7 x weekly - 3 sets - 10 reps  Supine Sciatic Nerve Glide - 1 x daily - 7 x weekly - 1 sets - 20 reps  Supine Hip External Rotation Stretch - 1 x daily - 7 x weekly - 1 sets - 3 reps - 1 min hold

## 2021-07-29 ENCOUNTER — OFFICE VISIT (OUTPATIENT)
Dept: OBGYN CLINIC | Facility: CLINIC | Age: 81
End: 2021-07-29
Payer: MEDICARE

## 2021-07-29 ENCOUNTER — OFFICE VISIT (OUTPATIENT)
Dept: PHYSICAL THERAPY | Facility: CLINIC | Age: 81
End: 2021-07-29
Payer: MEDICARE

## 2021-07-29 VITALS
HEART RATE: 64 BPM | DIASTOLIC BLOOD PRESSURE: 71 MMHG | HEIGHT: 66 IN | WEIGHT: 186 LBS | BODY MASS INDEX: 29.89 KG/M2 | SYSTOLIC BLOOD PRESSURE: 188 MMHG

## 2021-07-29 DIAGNOSIS — M70.62 GREATER TROCHANTERIC BURSITIS OF LEFT HIP: ICD-10-CM

## 2021-07-29 DIAGNOSIS — M54.32 SCIATICA OF LEFT SIDE: Primary | ICD-10-CM

## 2021-07-29 DIAGNOSIS — M25.552 PAIN IN LEFT HIP: ICD-10-CM

## 2021-07-29 PROCEDURE — 97112 NEUROMUSCULAR REEDUCATION: CPT

## 2021-07-29 PROCEDURE — 97110 THERAPEUTIC EXERCISES: CPT

## 2021-07-29 PROCEDURE — 99214 OFFICE O/P EST MOD 30 MIN: CPT | Performed by: ORTHOPAEDIC SURGERY

## 2021-07-29 NOTE — PROGRESS NOTES
Assessment/Plan:  1  Sciatica of left side     2  Greater trochanteric bursitis of left hip     3  Pain in left hip       Scribe Attestation    I,:  Dagmar Valerio am acting as a scribe while in the presence of the attending physician :       I,:  Do Pantoja, DO personally performed the services described in this documentation    as scribed in my presence :           Huber Jesus is a pleasant 78-year-old female who returns today for follow-up evaluation of her left hip pain due to greater trochanteric bursitis  I am pleased with the improvement she reports following physical therapy and Voltaren gel  We discussed the importance of maintaining her home exercise program   I also encouraged her to reinitiate use of Voltaren gel if her lateral symptoms return  She should continue with her efforts at formal therapy with respect to her sciatica  We will see her back on an as-needed basis for these issues  Subjective: Follow-up evaluation for left hip pain    Patient ID: Robb King is a 78 y o  female who returns today for follow-up evaluation of her left hip pain  At her last visit, she was referred to formal therapy for greater trochanteric bursitis and also instructed to use Voltaren gel  She states that she finished physical therapy and had excellent relief her lateral left-sided pain  She also found the Voltaren gel quite effective for her  She is now able to lay on that side at night when sleeping  She does report a recent onset sciatica which was diagnosed by her physical therapist and primary care physician  She has transition her efforts at formal therapy to her sciatica diagnosis  She denies any new injury or trauma  Review of Systems   Constitutional: Negative for activity change, chills, fever and unexpected weight change  HENT: Negative for hearing loss, nosebleeds and sore throat  Eyes: Negative for pain, redness and visual disturbance     Respiratory: Negative for cough, shortness of breath and wheezing  Cardiovascular: Negative for chest pain, palpitations and leg swelling  Gastrointestinal: Negative for abdominal pain, nausea and vomiting  Endocrine: Negative for polydipsia and polyuria  Genitourinary: Negative for dysuria and hematuria  Musculoskeletal: Negative for arthralgias, joint swelling and myalgias  See HPI   Skin: Negative for rash and wound  Neurological: Negative for dizziness, numbness and headaches  Psychiatric/Behavioral: Negative for decreased concentration and suicidal ideas  The patient is not nervous/anxious            Past Medical History:   Diagnosis Date    Hypertension        Past Surgical History:   Procedure Laterality Date    BLADDER SURGERY      CATARACT EXTRACTION Bilateral     RETINAL DETACHMENT SURGERY Bilateral        Family History   Problem Relation Age of Onset    No Known Problems Mother     No Known Problems Father     No Known Problems Sister     No Known Problems Brother     No Known Problems Maternal Aunt     No Known Problems Maternal Uncle     No Known Problems Paternal Aunt     No Known Problems Paternal Uncle     No Known Problems Maternal Grandmother     No Known Problems Maternal Grandfather     No Known Problems Paternal Grandmother     No Known Problems Paternal Grandfather        Social History     Occupational History    Not on file   Tobacco Use    Smoking status: Never Smoker    Smokeless tobacco: Never Used   Substance and Sexual Activity    Alcohol use: Never    Drug use: Never    Sexual activity: Not on file         Current Outpatient Medications:     Cholecalciferol (Vitamin D3) 1 25 MG (79632 UT) TABS, Take by mouth, Disp: , Rfl:     hydrochlorothiazide (MICROZIDE) 12 5 mg capsule, Take 12 5 mg by mouth daily, Disp: , Rfl:     Diclofenac Sodium (VOLTAREN) 1 %, Apply 2 g topically 4 (four) times a day (Patient not taking: Reported on 5/7/2021), Disp: 1 Tube, Rfl: 0    No Known Allergies    Objective:  Vitals:    07/29/21 1438   BP: (!) 188/71   Pulse: 64       Body mass index is 30 02 kg/m²  Left Hip Exam     Tenderness   Left hip tenderness location: piriformis fossa  Range of Motion   Abduction: normal   Adduction: normal   Flexion: normal   External rotation: normal   Internal rotation: normal     Muscle Strength   Flexion: 5/5     Tests   TEZ: negative    Other   Erythema: absent  Scars: absent  Sensation: normal  Pulse: present    Comments:  Stinchfield: negative  FADIR: negative            Physical Exam  Vitals and nursing note reviewed  Constitutional:       Appearance: She is well-developed  HENT:      Head: Normocephalic and atraumatic  Eyes:      General: No scleral icterus  Conjunctiva/sclera: Conjunctivae normal    Cardiovascular:      Rate and Rhythm: Normal rate  Pulmonary:      Effort: Pulmonary effort is normal  No respiratory distress  Musculoskeletal:      Cervical back: Normal range of motion and neck supple  Comments: As noted in HPI   Skin:     General: Skin is warm and dry  Neurological:      Mental Status: She is alert and oriented to person, place, and time     Psychiatric:         Behavior: Behavior normal

## 2021-07-29 NOTE — PROGRESS NOTES
Daily Note     Today's date: 2021  Patient name: Sendy Logan  : 1940  MRN: [id number]  Referring provider: Self, Referral  Dx:   Encounter Diagnosis     ICD-10-CM    1  Sciatica of left side  M54 32                   Subjective: Pt reports she feels the exercises are helping her but the relief is non lasting in nature at this time  Objective: See treatment diary below      Assessment: Tolerated treatment fair, cueing req t/o for proper form and technique w/ exercises  Patient would benefit from continued PT  Plan: Continue per plan of care  Precautions:   Past Medical History:   Diagnosis Date    Hypertension        SOC: 2021  FOTO: 2021  POC Expiration: 10/13/2021  Daily Treatment Log:  Date 2021     Visit #  2 3     Manual                        There Exer        LUCILA w/ B/L knee flexion 2x10 2 x 10 2x10     Supine fig 4 Pirif str - slide bottem leg closer 1'x3 30 sec x 3 1'x3     Sciatic NG w/ SOS 20x R/L 20 x each 20xea     Stand hip ext   RTB 2x10 alt     Stand hip abd   RTB 2x10 alt     Leg press   45# 2x10 upright     Leg press uni    10# 20x R/L     HEP        There Activ                                                        NMReed        Bridges  2 x 10 2x10     Clam shells  Blue 2 x 10 Blue 2x10     Squats at bar   10x 2x10     Step ups  10x each 6 in 10x R/L ea     Lat step ups    6" 10x R/L ea      Modalities                                Access Code: [email or web address]  URL: [employer]  [email or web address]  Date: 2021  Prepared by:  Kesha Ortiz    Exercises  Static Prone on Elbows - 1 x daily - 7 x weekly - 3 sets - 10 reps  Supine Sciatic Nerve Glide - 1 x daily - 7 x weekly - 1 sets - 20 reps  Supine Hip External Rotation Stretch - 1 x daily - 7 x weekly - 1 sets - 3 reps - 1 min hold

## 2021-07-30 ENCOUNTER — APPOINTMENT (OUTPATIENT)
Dept: RADIOLOGY | Facility: CLINIC | Age: 81
End: 2021-07-30
Payer: MEDICARE

## 2021-07-30 ENCOUNTER — OFFICE VISIT (OUTPATIENT)
Dept: OBGYN CLINIC | Facility: CLINIC | Age: 81
End: 2021-07-30
Payer: MEDICARE

## 2021-07-30 VITALS
HEIGHT: 66 IN | HEART RATE: 72 BPM | WEIGHT: 185.2 LBS | BODY MASS INDEX: 29.77 KG/M2 | SYSTOLIC BLOOD PRESSURE: 145 MMHG | DIASTOLIC BLOOD PRESSURE: 76 MMHG

## 2021-07-30 DIAGNOSIS — M65.342 TRIGGER RING FINGER OF LEFT HAND: ICD-10-CM

## 2021-07-30 DIAGNOSIS — M18.12 ARTHRITIS OF CARPOMETACARPAL (CMC) JOINT OF LEFT THUMB: ICD-10-CM

## 2021-07-30 DIAGNOSIS — M18.12 ARTHRITIS OF CARPOMETACARPAL (CMC) JOINT OF LEFT THUMB: Primary | ICD-10-CM

## 2021-07-30 PROCEDURE — 20600 DRAIN/INJ JOINT/BURSA W/O US: CPT | Performed by: ORTHOPAEDIC SURGERY

## 2021-07-30 PROCEDURE — 73140 X-RAY EXAM OF FINGER(S): CPT

## 2021-07-30 PROCEDURE — 99213 OFFICE O/P EST LOW 20 MIN: CPT | Performed by: ORTHOPAEDIC SURGERY

## 2021-07-30 RX ORDER — TRIAMCINOLONE ACETONIDE 40 MG/ML
20 INJECTION, SUSPENSION INTRA-ARTICULAR; INTRAMUSCULAR
Status: COMPLETED | OUTPATIENT
Start: 2021-07-30 | End: 2021-07-30

## 2021-07-30 RX ORDER — LIDOCAINE HYDROCHLORIDE 10 MG/ML
0.5 INJECTION, SOLUTION EPIDURAL; INFILTRATION; INTRACAUDAL; PERINEURAL
Status: COMPLETED | OUTPATIENT
Start: 2021-07-30 | End: 2021-07-30

## 2021-07-30 RX ADMIN — TRIAMCINOLONE ACETONIDE 20 MG: 40 INJECTION, SUSPENSION INTRA-ARTICULAR; INTRAMUSCULAR at 10:50

## 2021-07-30 RX ADMIN — LIDOCAINE HYDROCHLORIDE 0.5 ML: 10 INJECTION, SOLUTION EPIDURAL; INFILTRATION; INTRACAUDAL; PERINEURAL at 10:50

## 2021-07-30 NOTE — PROGRESS NOTES
Assessment/Plan:  1  Arthritis of carpometacarpal (CMC) joint of left thumb  XR thumb left first digit-min 2v    Small joint arthrocentesis: L thumb CMC   2  Trigger ring finger of left hand  Ambulatory referral to PT/OT hand therapy       Scribe Attestation    I,:  Pastora Padilla MA am acting as a scribe while in the presence of the attending physician :       I,:  Vito Stewart DO personally performed the services described in this documentation    as scribed in my presence  :             47-year-old female with left thumb CMC arthritis and early trigger finger left ring finger  She is tender to palpation over the thumb CMC and A1 pulley left ring finger  There is no obvious triggering on exam  We did discuss repeat steroid injections  She was agreeable to this  She consented and underwent a left thumb CMC injection in the office today without any complications  We will hold off on a trigger finger injection as this is not very bothersome for her and I do limit these injections to 3 total  A referral was provided to formal therapy for range of motion  She may attend one session and continue with HEP  She will follow up in 3 months for repeat evaluation  Subjective:   Modesta Saleh is a 78 y o  female who presents to the office today for follow up evaluation left thumb CMC arthritis  Patient underwent a steroid injection at her last visit which she states provided her with good relief  Patient states her pain has began to return  She notes pain to the base of her left thumb  She also notes pain and stiffness left ring finger  She denies any locking or catching  Review of Systems   Constitutional: Negative for chills and fever  HENT: Negative for drooling and sneezing  Eyes: Negative for redness  Respiratory: Negative for cough and wheezing  Gastrointestinal: Negative for nausea and vomiting  Musculoskeletal: Negative for arthralgias, joint swelling and myalgias     Neurological: Negative for weakness and numbness  Psychiatric/Behavioral: Negative for behavioral problems  The patient is not nervous/anxious  Past Medical History:   Diagnosis Date    Hypertension        Past Surgical History:   Procedure Laterality Date    BLADDER SURGERY      CATARACT EXTRACTION Bilateral     RETINAL DETACHMENT SURGERY Bilateral        Family History   Problem Relation Age of Onset    No Known Problems Mother     No Known Problems Father     No Known Problems Sister     No Known Problems Brother     No Known Problems Maternal Aunt     No Known Problems Maternal Uncle     No Known Problems Paternal Aunt     No Known Problems Paternal Uncle     No Known Problems Maternal Grandmother     No Known Problems Maternal Grandfather     No Known Problems Paternal Grandmother     No Known Problems Paternal Grandfather        Social History     Occupational History    Not on file   Tobacco Use    Smoking status: Never Smoker    Smokeless tobacco: Never Used   Substance and Sexual Activity    Alcohol use: Never    Drug use: Never    Sexual activity: Not on file         Current Outpatient Medications:     Cholecalciferol (Vitamin D3) 1 25 MG (72218 UT) TABS, Take by mouth, Disp: , Rfl:     hydrochlorothiazide (MICROZIDE) 12 5 mg capsule, Take 12 5 mg by mouth daily, Disp: , Rfl:     Diclofenac Sodium (VOLTAREN) 1 %, Apply 2 g topically 4 (four) times a day (Patient not taking: Reported on 5/7/2021), Disp: 1 Tube, Rfl: 0    No Known Allergies    Objective:  Vitals:    07/30/21 1014   BP: 145/76   Pulse: 72       Ortho Exam     Left thumb    TTP thumb CMC  + grind  TTP A1 pulley ring finger  No obvious trigger ring finger   Compartments soft  Brisk capillary refill  S/m intact median, radial, and ulnar nerve     Physical Exam  Constitutional:       Appearance: She is well-developed  HENT:      Head: Normocephalic and atraumatic  Eyes:      General:         Right eye: No discharge           Left eye: No discharge  Conjunctiva/sclera: Conjunctivae normal    Cardiovascular:      Rate and Rhythm: Normal rate  Pulmonary:      Effort: Pulmonary effort is normal  No respiratory distress  Musculoskeletal:      Cervical back: Normal range of motion and neck supple  Comments: As noted in HPI   Skin:     General: Skin is warm and dry  Neurological:      Mental Status: She is alert and oriented to person, place, and time  Psychiatric:         Behavior: Behavior normal          Thought Content: Thought content normal          Judgment: Judgment normal      Small joint arthrocentesis: L thumb CMC  [place] Protocol:  Consent: Verbal consent obtained  Consent given by: patient  Patient identity confirmed: verbally with patient    Supporting Documentation  Indications: pain   Procedure Details  Location: thumb - L thumb CMC  Preparation: Patient was prepped and draped in the usual sterile fashion  Needle size: 27 G  Ultrasound guidance: no  Medications administered: 0 5 mL lidocaine (PF) 1 %; 20 mg triamcinolone acetonide 40 mg/mL    Patient tolerance: patient tolerated the procedure well with no immediate complications  Dressing:  Sterile dressing applied            I have personally reviewed pertinent films in PACS and my interpretation is as follows: X-ray left thumb performed in the office today demonstrates left thumb CMC arthritis

## 2021-08-03 ENCOUNTER — OFFICE VISIT (OUTPATIENT)
Dept: PHYSICAL THERAPY | Facility: CLINIC | Age: 81
End: 2021-08-03
Payer: MEDICARE

## 2021-08-03 DIAGNOSIS — M54.32 SCIATICA OF LEFT SIDE: Primary | ICD-10-CM

## 2021-08-03 PROCEDURE — 97110 THERAPEUTIC EXERCISES: CPT | Performed by: PHYSICAL THERAPIST

## 2021-08-03 NOTE — PROGRESS NOTES
Daily Note     Today's date: 8/3/2021  Patient name: Marlon Erickson  : 1940  MRN: [id number]  Referring provider: Self, Referral  Dx:   Encounter Diagnosis     ICD-10-CM    1  Sciatica of left side  M54 32                   Subjective: Dr Dean Mcneill said to continue the exercises but I'm not sure which ones, the new ones and the old ones? Is that too many? Objective: See treatment diary below; reviewed HEP's from prior episodes of care and updated HEP for pt today to avoid overlap/redundant exercises  LUCILA EIS against wall 1x10 =  NE  Sit pirif stretch 30"x3 = dec/B    Reviewed HEP in full and rationale for HEP vs mechanically provocative activities  Pt education re: source of pain and use of HEP for pain relief  Assessment: Tolerated treatment well  Patient will benefit from simplified HEP to avoid confusion and promote use of HEP prn for relief after provocative activities  Plan: Continue per plan of care  Resume strenghtening       Precautions:   Past Medical History:   Diagnosis Date    Hypertension        SOC: 2021  FOTO: 2021  POC Expiration: 10/13/2021  Daily Treatment Log:  Date 2021 7/26 2021 8/3/2021    Visit #  2 3 4    Manual                        There Exer    40'    LUCILA w/ B/L knee flexion 2x10 2 x 10 2x10 2x10    Supine fig 4 Pirif str - slide bottem leg closer 1'x3 30 sec x 3 1'x3 30"x3 w/ SOS pirif str    Sciatic NG w/ SOS 20x R/L 20 x each 20xea 20x RL    Stand hip ext   RTB 2x10 alt     Stand hip abd   RTB 2x10 alt     Leg press   45# 2x10 upright     Leg press uni    10# 20x R/L     Sit pirif stretch    30"x3    HEP    Updated/review    There Activ                                                        NMReed        Bridges  2 x 10 2x10     Clam shells  Blue 2 x 10 Blue 2x10     Squats at bar   10x 2x10     Step ups  10x each 6 in 10x R/L ea     Lat step ups    6" 10x R/L ea      Modalities                                  Access Code: [email or web address]  URL: [email or web address]  [email or web address]  Date: 08/03/2021  Prepared by:  Kesha Ortiz    Exercises  Static Prone on Elbows - 1 x daily - 7 x weekly - 3 sets - 10 reps  Supine Sciatic Nerve Glide - 1 x daily - 7 x weekly - 1 sets - 20 reps  Supine Piriformis Stretch - 1 x daily - 7 x weekly - 1 sets - 3 reps - 30 hold  Supine Bridge - 1 x daily - 7 x weekly - 2 sets - 10 reps

## 2021-08-05 ENCOUNTER — OFFICE VISIT (OUTPATIENT)
Dept: PHYSICAL THERAPY | Facility: CLINIC | Age: 81
End: 2021-08-05
Payer: MEDICARE

## 2021-08-05 DIAGNOSIS — M54.32 SCIATICA OF LEFT SIDE: Primary | ICD-10-CM

## 2021-08-05 PROCEDURE — 97110 THERAPEUTIC EXERCISES: CPT

## 2021-08-05 PROCEDURE — 97112 NEUROMUSCULAR REEDUCATION: CPT

## 2021-08-05 NOTE — PROGRESS NOTES
Daily Note     Today's date: 2021  Patient name: Jason Deleon  : 1940  MRN: [id number]  Referring provider: Self, Referral  Dx:   Encounter Diagnosis     ICD-10-CM    1  Sciatica of left side  M54 32        Start Time: 8680  Stop Time: 1740  Total time in clinic (min): 45 minutes    Subjective: Patient reporting her sister is visiting and she has been moving around more, so she feels better  Reports difficulty doing the "new stretch" on the bed  Objective: See treatment diary below      Assessment: Tolerated treatment well  Patient demonstrated fatigue post treatment, exhibited good technique with therapeutic exercises and would benefit from continued PT      Plan: Continue per plan of care  Progress treatment as tolerated  Precautions:   Past Medical History:   Diagnosis Date    Hypertension        SOC: 2021  FOTO: 2021  POC Expiration: 10/13/2021  Daily Treatment Log:  Date 2021 7/26 2021 8/3/2021 8/5/21   Visit #  2 3 4 5   Manual                        Ther Exer    40' 20'   LUCILA w/ B/L knee flexion 2x10 2 x 10 2x10 2x10 2x10   Supine fig 4 Pirif str - slide bottem leg closer 1'x3 30 sec x 3 1'x3 30"x3 w/ SOS pirif str 30"x2 w/ SOS   Sciatic NG w/ SOS 20x R/L 20 x each 20xea 20x RL 20x R/L   Stand hip ext   RTB 2x10 alt  RTB 2x10 alt   Stand hip abd   RTB 2x10 alt  RTB 2x10 alt   Leg press   45# 2x10 upright     Leg press uni    10# 20x R/L     Sit pirif stretch    30"x3    HEP    Updated/review    Ther Activ                                                        NMReed     20'   Bridges  2 x 10 2x10  2x10   Clam shells  Blue 2 x 10 Blue 2x10  Blue 2x10   Squats at bar   10x 2x10     Step ups  10x each 6 in 10x R/L ea  2x10 R/L 6"   Lat step ups    6" 10x R/L ea   2x10 R/L 6"   Modalities                                  Access Code: [email or web address]  URL: [email or web address]  Date: 2021  Prepared by:  Kesha Ortiz    Exercises  Static Prone on Elbows - 1 x daily - 7 x weekly - 3 sets - 10 reps  Supine Sciatic Nerve Glide - 1 x daily - 7 x weekly - 1 sets - 20 reps  Supine Piriformis Stretch - 1 x daily - 7 x weekly - 1 sets - 3 reps - 30 hold  Supine Bridge - 1 x daily - 7 x weekly - 2 sets - 10 reps

## 2021-08-10 ENCOUNTER — OFFICE VISIT (OUTPATIENT)
Dept: PHYSICAL THERAPY | Facility: CLINIC | Age: 81
End: 2021-08-10
Payer: MEDICARE

## 2021-08-10 DIAGNOSIS — M54.32 SCIATICA OF LEFT SIDE: Primary | ICD-10-CM

## 2021-08-10 PROCEDURE — 97110 THERAPEUTIC EXERCISES: CPT | Performed by: PHYSICAL THERAPIST

## 2021-08-10 PROCEDURE — 97530 THERAPEUTIC ACTIVITIES: CPT | Performed by: PHYSICAL THERAPIST

## 2021-08-10 NOTE — PROGRESS NOTES
Daily Note     Today's date: 8/10/2021  Patient name: Sulma Dunlap  : 1940  MRN: [id number]  Referring provider: Self, Referral  Dx:   Encounter Diagnosis     ICD-10-CM    1  Sciatica of left side  M54 32                   Subjective: Pt reports she is much better and attributes it to doing her sitting (piriformis) stretch and not sitting as much  She will still feel 3/10 pain w/ static bending forward  Objective: See treatment diary below - pretest symptoms = 3/10 soreness w/ bending over w/ reaching down  LUCILA w/ B/L knee flex 2x10, man lumb ext mobs, LUCILA w/ B/L knee flexion 2x10 = NE  Supine pirif stretch 30"x3 = abolish      Assessment: Tolerated treatment well  Patient expresses understanding of instruction to do piriformis stretch a few times/day vs other exercises TIW  She may need review of HEP next visit  Plan: Potential discharge next visit  Precautions:   Past Medical History:   Diagnosis Date    Hypertension        SOC: 2021  FOTO: 2021  POC Expiration: 10/13/2021  Daily Treatment Log:  Date 8/10/2021  FOTO       Visit # 6       Manual                        Ther Exer 30'       LUCILA w/ B/L knee flexion 2x10       Supine fig 4 Pirif str - slide bottem leg closer 30"x3 w/ SOS       Sciatic NG w/ SOS        Stand hip ext RTB 20x R/L       Stand hip abd RTB 20x R/L       Leg press supine w/ abd grn 2x10 45#       Leg press uni         Sit pirif stretch 30"x3 R/L       HEP updated       Ther Activ 15'       Bridge w/ abd Blue 2x10       STS w/ OH press 2# 2x10       clamshells Blue 2x10                               NMReed        Step ups        Lat step ups         Modalities                                  Access Code: [email or web address]  URL: [email or web address]  Date: 08/10/2021  Prepared by:  Wu Martinez    Exercises  Supine Piriformis Stretch - 3 x daily - 7 x weekly - 1 sets - 3 reps - 30 hold  Static Prone on Elbows - 3 x weekly - 3 sets - 10 reps  Supine Sciatic Nerve Glide - 3 x weekly - 1 sets - 20 reps  Bridge with Hip Abduction and Resistance - 3 x weekly - 2 sets - 10 reps  Abdominal Press into [place] - 3 x weekly - 1 sets - 10 reps - 5 hold  Squat with Chair Touch - 3 x weekly - 2 sets - 10 reps  Standing Hip Abduction with Resistance at Ankles and Counter Support - 3 x weekly - 2 sets - 10 reps  Standing Hip Extension Kicks - 3 x weekly - 2 sets - 10 reps  Seated Piriformis Stretch with Trunk Bend - 1 x daily - 7 x weekly - 1 sets - 3 reps - 30 hold      Access Code: [email or web address]  URL: [employer]/  Date: 08/03/2021  Prepared by:  Kesha Ortiz    Exercises  Static Prone on Elbows - 1 x daily - 7 x weekly - 3 sets - 10 reps  Supine Sciatic Nerve Glide - 1 x daily - 7 x weekly - 1 sets - 20 reps  Supine Piriformis Stretch - 1 x daily - 7 x weekly - 1 sets - 3 reps - 30 hold  Supine Bridge - 1 x daily - 7 x weekly - 2 sets - 10 reps

## 2021-08-12 ENCOUNTER — OFFICE VISIT (OUTPATIENT)
Dept: PHYSICAL THERAPY | Facility: CLINIC | Age: 81
End: 2021-08-12
Payer: MEDICARE

## 2021-08-12 DIAGNOSIS — M54.32 SCIATICA OF LEFT SIDE: Primary | ICD-10-CM

## 2021-08-12 PROCEDURE — 97530 THERAPEUTIC ACTIVITIES: CPT | Performed by: PHYSICAL THERAPIST

## 2021-08-12 PROCEDURE — 97110 THERAPEUTIC EXERCISES: CPT | Performed by: PHYSICAL THERAPIST

## 2021-08-12 NOTE — PROGRESS NOTES
PT Re-Evaluation  and PT Discharge    Today's date: 2021  Patient name: Tania Akhtar  : 1940  MRN: [id number]  Referring provider: Self, Referral  Dx:   Encounter Diagnosis     ICD-10-CM    1  Sciatica of left side  M54 32                   Assessment  Assessment details: 2021: Tania Akhtar is a 78 y o  female who presents to physical therapy on a direct acces basis with pain, decreased strength, decreased ROM, decreased joint mobility and postural  dysfunction  Due to these impairments, patient has difficulty performing ADL's, ambulation, sitting/driving and rising from sitting/driving, stair negotiation  Patient's clinical presentation is consistent with Sciatica of left side  (primary encounter diagnosis) as a result of lumbar derangement w/ directional preference of extension  Patient has been educated in home exercise program and plan of care  Patient would benefit from skilled physical therapy services to address their aforementioned functional limitations and progress towards prior level of function and independence with home exercise program and self symptom management/resolution  2021: Pt reports she has no remaining functional limitations  She experiences mild L buttock tightness/discomfort if she reaches down/forward and remains there (intermittently, and is abolished/does not occur if she does her piriformis stretch)  She has met all objective and functional goals and demonstrates understanding of and ability to complete HEP  Pt is appropriate to D/C to HEP at this time  Understanding of Dx/Px/POC: good   Prognosis: good    Goals  Short Term Goals: Target Date 2021 - met all STG's  1  Initiate and advance HEP toward self symptom reduction/resolution  2  Improve postural awareness and demonstrate self postural correction  3  Improve AROM lumbar spine to min baez or better t/o   4  Undisturbed sleep  5  Pt to tolerate prolonged sitting 30 min or more w/o c/o        Long Term Goals: Target Date 10/13/2021  1  Indep with HEP and self symptom prevention  - met  2  Achieve lumbar AROM to WNL, except exten to be min baez or better w/o c/o  - met  3  Improve LE strength to 5/5 B/L w/o pain provocation  - met except hip ext 4+/5 B/L with no pain  4  Improve L quad/pirif flexibility to min baez or better to reduce symptom irritability  - met  5  Pt to tolerate sitting/driving for an hour w/o pain  - met      Plan  Planned therapy interventions: home exercise program  Frequency: 2x week  Plan of Care beginning date: 2021  Plan of Care expiration date: 10/13/2021  Treatment plan discussed with: patient        Subjective Evaluation    History of Present Illness  Mechanism of injury: Subjective 2021: Pt reports an onset of her episodic sciatica about 2 weeks ago w/o incident  Symptoms are constant, worse with sitting  2021: Pt reports no remaining pain w/ stairs or driving  She only feels mild discomfort w/ combined reaching down/forward sustained  This goes away if she "does her stretch" (piriformis)  Discomfort has localized to buttock vs across low back, into buttock and thigh  Pain  Current pain ratin  At best pain ratin  At worst pain ratin  Pain location: L buttock  Quality: dull ache (numb type feeling)  Exacerbated by: reaching foward/down sustained  Progression: improved      Diagnostic Tests  No diagnostic tests performed  Treatments  Treatments tried: chiro with prior episodes, not this one  Current treatment: physical therapy  Patient Goals  Patient goals for therapy: decreased pain  Patient goal: improve sitting tolerance        Objective     Posture: Lumbar lordosis is reduced in standing  There is no lateral shift  In siting, lumbar roll improves comfort      Lumbar AROM limitations:  (* =  Pain)     2021  Lumbar flexion: Nil  Min*  Lumbar extension: Min  Mod/alexsandra  R side glide:  Nil  min  L side glide:  Nil  Min*    Mechanical Asessment: pre-test symtpoms include pain w/ MMT L LE; pain w/ lumbar AROM        8/12/2021 7/21/2021  Repeated Exten in Standing (BRITTANY) 3x10: NE  NE  LUCILA w/ B/L knee flexion 2x10:  NE  Dec/B  Sit pirif stretch 20"x3:    Abolish  NT  Strength:     Right  Left  Right  Left     8/12/2021 8/12/2021 7/21/2021 7/21/2021  Hip flexion:  5/5  5/5  5/5  4+/5*  Knee ext  5/5  5/5  5/5  5/5  Ankle DF  5/5  5/5  5/5  5/5  Great toe ext  5/5  5/5  5/5  5/5  Ankle PF  5/5  5/5  5/5  5/5  Knee flex  5/5  5/5  5/5  4/5*      Hip abduction  5/5  5/5  5/5  5/5  Hip external rotat 5/5  5/5  5/5  5/5*  Hip internal rotat 5/5  5/5  5/5  4+/5*  Hip extension  4+/5  4+/5  4+/5  4/5    Sensation: reported WNL B/L LEs     Flexibility: 8/12/2021 - WFL B/L hams; min baez B/L pirif and quads   7/21/2021 - WFL B/L hams, mod baez L pirif and quads vs min baez R;    Function: 8/12/2021 - no remaining pain w/ stairs or driving   4/46/0566 - pain w/ stairs, prolonged sitting/driving    DTR's: WNL B/L L4 and S1    Palpation: no TTP L piriformis; post to anterior lumbar mobility is poor w/o pain       Precautions:   Past Medical History:   Diagnosis Date    Hypertension        SOC: 7/21/2021  FOTO: 7/21/2021  POC Expiration: 10/13/2021  Daily Treatment Log:  Date 8/10/2021  FOTO 8/12/2021  RE      Visit # 6 7      Manual                        Ther Exer 30' 35'      LUCILA w/ B/L knee flexion 2x10 3x10      Supine fig 4 Pirif str - slide bottem leg closer 30"x3 w/ SOS 30"x3      Sciatic NG w/ SOS  2x10 R/L      Stand hip ext RTB 20x R/L RTB 20x R/L      Stand hip abd RTB 20x R/L RTB 20x R/L      Leg press supine w/ abd grn 2x10 45#       Sup christie opp UE/LE w/ TA 5"x10 ea 5"x10 ea      Sit pirif stretch 30"x3 R/L 30"x3 R/L      HEP updated       Ther Activ 15' 10'      Bridge w/ abd Blue 2x10 Blue 5"2x10      STS w/ OH press 2# 2x10 2# 2x10      clamshells Blue 2x10                               NMReed        Step ups        Lat step ups         Modalities Access Code: [email or web address]  URL: [employer]/  Date: 08/10/2021  Prepared by: Harris Koenig    Exercises  Supine Piriformis Stretch - 3 x daily - 7 x weekly - 1 sets - 3 reps - 30 hold  Static Prone on Elbows - 3 x weekly - 3 sets - 10 reps  Supine Sciatic Nerve Glide - 3 x weekly - 1 sets - 20 reps  Bridge with Hip Abduction and Resistance - 3 x weekly - 2 sets - 10 reps  Abdominal Press into [place] - 3 x weekly - 1 sets - 10 reps - 5 hold  Squat with Chair Touch - 3 x weekly - 2 sets - 10 reps  Standing Hip Abduction with Resistance at Ankles and Counter Support - 3 x weekly - 2 sets - 10 reps  Standing Hip Extension Kicks - 3 x weekly - 2 sets - 10 reps  Seated Piriformis Stretch with Trunk Bend - 1 x daily - 7 x weekly - 1 sets - 3 reps - 30 hold      Access Code: [email or web address]  URL: [employer]/  Date: 08/03/2021  Prepared by:  Kesha Ortiz    Exercises  Static Prone on Elbows - 1 x daily - 7 x weekly - 3 sets - 10 reps  Supine Sciatic Nerve Glide - 1 x daily - 7 x weekly - 1 sets - 20 reps  Supine Piriformis Stretch - 1 x daily - 7 x weekly - 1 sets - 3 reps - 30 hold  Supine Bridge - 1 x daily - 7 x weekly - 2 sets - 10 reps

## 2021-08-18 ENCOUNTER — EVALUATION (OUTPATIENT)
Dept: OCCUPATIONAL THERAPY | Facility: CLINIC | Age: 81
End: 2021-08-18
Payer: MEDICARE

## 2021-08-18 DIAGNOSIS — M65.342 TRIGGER RING FINGER OF LEFT HAND: Primary | ICD-10-CM

## 2021-08-18 PROCEDURE — 97110 THERAPEUTIC EXERCISES: CPT

## 2021-08-18 PROCEDURE — 97165 OT EVAL LOW COMPLEX 30 MIN: CPT

## 2021-08-18 NOTE — PROGRESS NOTES
OT Evaluation     Today's date: 2021  Patient name: Jeffy Robison  : 1940  MRN: [id number]  Referring provider: Benito Gan DO  Dx:   Encounter Diagnosis     ICD-10-CM    1  Trigger ring finger of left hand  M65 342 Ambulatory referral to PT/OT hand therapy                  Assessment  Assessment details: Patient is a 78 y o  RHD female who presents for OT IE and treatment for Left hand trigger finger on ring finger and thumb CMC arthritis  Patient reports having the pain and stiffness about her ring finger and thumb since 2021  Patient referred by Dr Candido De La Torre to initiate treatment including hand therapy  Impairments: abnormal or restricted ROM, impaired physical strength, lacks appropriate home exercise program and pain with function  Understanding of Dx/Px/POC: good   Prognosis: good    Goals  Short term goals 2-4 weeks  Establish HEP to enhance performance with ADLs  Long Term goals by discharge  Establish final home exercise program to enhance maximal functional level with ADLs  Plan  Patient would benefit from: OT eval and skilled occupational therapy  Planned modality interventions: thermotherapy: hydrocollator packs and cryotherapy  Planned therapy interventions: manual therapy, joint mobilization, strengthening, stretching, therapeutic activities, therapeutic exercise, home exercise program, graded exercise, graded activity, functional ROM exercises and flexibility  Frequency: 1x week  Duration in weeks: 4  Plan of Care beginning date: 2021  Plan of Care expiration date: 9/15/2021  Treatment plan discussed with: patient        Subjective Evaluation    History of Present Illness  Mechanism of injury: Patient is a 78 y o  RHD female who presents for OT IE and treatment for Left hand trigger finger on ring finger and thumb CMC arthritis  Patient reports having the pain since 2021  Patient referred by Dr Candido De La oTrre to initiate treatment including hand therapy  Pain  Current pain ratin  At best pain ratin  At worst pain ratin  Quality: tight    Patient Goals  Patient goals for therapy: decreased edema, decreased pain, increased motion, increased strength, independence with ADLs/IADLs and return to work          Objective           Precautions:        Manuals HEP                        Ther Ex     Education on HEP and dx x5 min     AROM tendon glides x10    AROM table top extension x10    AROM digit add/abduction x10    AROM wrist flex/ext/RD/UD x10         PROM wrist flex/ext X3 10 sec    Thumb AROM all planes     1st dorsal compartment stretch x3 10 sec                        Modalities     MHP 5 min            Assessment:   Patient tolerated session well  Patient session focused on patient education on anatomy and physiology concerning current dx, techniques for decreasing deficits through HEP, and appropriate use of modalities  Patient educated on HEP to include modalities and ROM TE  with verbal instructions and handouts for patient reference  Patient educated on treatment plan at this time  Patient benefiting from skilled hand therapy OT to reduce deficits to improve independence with daily activities      Plan:   Focus on ROM to improve ability to complete daily activites with ease    POC 21 - 8/15/21

## 2021-09-21 NOTE — PROGRESS NOTES
Patient has not been seen for occupational therapy over previous 3 consecutive weeks  Patient does not have any scheduled follow-up occupational therapy appointments to date  As a result, occupational therapy for this episode of care will be resolved and the patient will be d/c'd  Unknown if patient goals met since discontinuation of OT treatment

## 2021-10-29 ENCOUNTER — OFFICE VISIT (OUTPATIENT)
Dept: OBGYN CLINIC | Facility: CLINIC | Age: 81
End: 2021-10-29
Payer: MEDICARE

## 2021-10-29 VITALS
BODY MASS INDEX: 29.73 KG/M2 | WEIGHT: 185 LBS | DIASTOLIC BLOOD PRESSURE: 65 MMHG | HEIGHT: 66 IN | HEART RATE: 68 BPM | SYSTOLIC BLOOD PRESSURE: 145 MMHG

## 2021-10-29 DIAGNOSIS — M18.12 ARTHRITIS OF CARPOMETACARPAL (CMC) JOINT OF LEFT THUMB: Primary | ICD-10-CM

## 2021-10-29 DIAGNOSIS — M65.342 TRIGGER RING FINGER OF LEFT HAND: ICD-10-CM

## 2021-10-29 PROCEDURE — 20600 DRAIN/INJ JOINT/BURSA W/O US: CPT | Performed by: ORTHOPAEDIC SURGERY

## 2021-10-29 PROCEDURE — 99213 OFFICE O/P EST LOW 20 MIN: CPT | Performed by: ORTHOPAEDIC SURGERY

## 2021-10-29 RX ORDER — LIDOCAINE HYDROCHLORIDE 10 MG/ML
0.5 INJECTION, SOLUTION INFILTRATION; PERINEURAL
Status: COMPLETED | OUTPATIENT
Start: 2021-10-29 | End: 2021-10-29

## 2021-10-29 RX ORDER — TRIAMCINOLONE ACETONIDE 40 MG/ML
20 INJECTION, SUSPENSION INTRA-ARTICULAR; INTRAMUSCULAR
Status: COMPLETED | OUTPATIENT
Start: 2021-10-29 | End: 2021-10-29

## 2021-10-29 RX ADMIN — TRIAMCINOLONE ACETONIDE 20 MG: 40 INJECTION, SUSPENSION INTRA-ARTICULAR; INTRAMUSCULAR at 11:01

## 2021-10-29 RX ADMIN — LIDOCAINE HYDROCHLORIDE 0.5 ML: 10 INJECTION, SOLUTION INFILTRATION; PERINEURAL at 11:01

## 2022-05-27 ENCOUNTER — OFFICE VISIT (OUTPATIENT)
Dept: OBGYN CLINIC | Facility: CLINIC | Age: 82
End: 2022-05-27
Payer: MEDICARE

## 2022-05-27 DIAGNOSIS — M18.12 ARTHRITIS OF CARPOMETACARPAL (CMC) JOINT OF LEFT THUMB: Primary | ICD-10-CM

## 2022-05-27 PROCEDURE — 20600 DRAIN/INJ JOINT/BURSA W/O US: CPT | Performed by: ORTHOPAEDIC SURGERY

## 2022-05-27 PROCEDURE — 99213 OFFICE O/P EST LOW 20 MIN: CPT | Performed by: ORTHOPAEDIC SURGERY

## 2022-05-27 RX ORDER — TRIAMCINOLONE ACETONIDE 40 MG/ML
40 INJECTION, SUSPENSION INTRA-ARTICULAR; INTRAMUSCULAR
Status: COMPLETED | OUTPATIENT
Start: 2022-05-27 | End: 2022-05-27

## 2022-05-27 RX ORDER — LIDOCAINE HYDROCHLORIDE 10 MG/ML
0.5 INJECTION, SOLUTION INFILTRATION; PERINEURAL
Status: COMPLETED | OUTPATIENT
Start: 2022-05-27 | End: 2022-05-27

## 2022-05-27 RX ADMIN — TRIAMCINOLONE ACETONIDE 40 MG: 40 INJECTION, SUSPENSION INTRA-ARTICULAR; INTRAMUSCULAR at 11:48

## 2022-05-27 RX ADMIN — LIDOCAINE HYDROCHLORIDE 0.5 ML: 10 INJECTION, SOLUTION INFILTRATION; PERINEURAL at 11:48

## 2022-05-27 NOTE — PROGRESS NOTES
Assessment/Plan:  1  Arthritis of carpometacarpal (CMC) joint of left thumb  Small joint arthrocentesis: L thumb CMC       Scribe Attestation    I,:  Pastora Padilla MA am acting as a scribe while in the presence of the attending physician :       I,:  Fernandez Gamez DO personally performed the services described in this documentation    as scribed in my presence  :             47-year-old female with left thumb CMC arthritis  Patient did see good relief from the previous steroid injection  Treatment options were discussed in the form of a repeat steroid injection  Patient was agreeable to this  She consented and underwent a left thumb CMC injection in the office today without any complications  Patient may follow up with me as needed  Subjective:   Tiffany Hatfield is a 78 y o  female who presents to the office today for follow up evaluation left thumb CMC arthritis  Patient underwent a steroid injection at her last visit on 10/29/21 which she states provided her with good relief  She states the pain started to return in the beginning of May  She notes pain to the base of her left thumb  She notes increased pain with  and opening mail  Patient did have 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 in February  Review of Systems   Constitutional: Negative for chills and fever  HENT: Negative for drooling and sneezing  Eyes: Negative for redness  Respiratory: Negative for cough and wheezing  Gastrointestinal: Negative for nausea and vomiting  Musculoskeletal: Negative for arthralgias, joint swelling and myalgias  Neurological: Negative for weakness and numbness  Psychiatric/Behavioral: Negative for behavioral problems  The patient is not nervous/anxious            Past Medical History:   Diagnosis Date    Hypertension        Past Surgical History:   Procedure Laterality Date    BLADDER SURGERY      CATARACT EXTRACTION Bilateral     RETINAL DETACHMENT SURGERY Bilateral        Family History Problem Relation Age of Onset    No Known Problems Mother     No Known Problems Father     No Known Problems Sister     No Known Problems Brother     No Known Problems Maternal Aunt     No Known Problems Maternal Uncle     No Known Problems Paternal Aunt     No Known Problems Paternal Uncle     No Known Problems Maternal Grandmother     No Known Problems Maternal Grandfather     No Known Problems Paternal Grandmother     No Known Problems Paternal Grandfather        Social History     Occupational History    Not on file   Tobacco Use    Smoking status: Never Smoker    Smokeless tobacco: Never Used   Substance and Sexual Activity    Alcohol use: Never    Drug use: Never    Sexual activity: Not on file         Current Outpatient Medications:     Cholecalciferol (Vitamin D3) 1 25 MG (33376 UT) TABS, Take by mouth, Disp: , Rfl:     Diclofenac Sodium (VOLTAREN) 1 %, Apply 2 g topically 4 (four) times a day (Patient not taking: Reported on 5/7/2021), Disp: 1 Tube, Rfl: 0    hydrochlorothiazide (MICROZIDE) 12 5 mg capsule, Take 12 5 mg by mouth daily, Disp: , Rfl:     No Known Allergies    Objective: There were no vitals filed for this visit  Ortho Exam     Left thumb    TTP thumb CMC  Compartments soft  Brisk capillary refill  S/m intact median, radial, and ulnar nerve     Physical Exam  Constitutional:       Appearance: She is well-developed  HENT:      Head: Normocephalic and atraumatic  Eyes:      General:         Right eye: No discharge  Left eye: No discharge  Conjunctiva/sclera: Conjunctivae normal    Cardiovascular:      Rate and Rhythm: Normal rate  Pulmonary:      Effort: Pulmonary effort is normal  No respiratory distress  Musculoskeletal:      Cervical back: Normal range of motion and neck supple  Comments: As noted in HPI   Skin:     General: Skin is warm and dry  Neurological:      Mental Status: She is alert and oriented to person, place, and time  Psychiatric:         Behavior: Behavior normal          Thought Content: Thought content normal          Judgment: Judgment normal      Small joint arthrocentesis: L thumb CMC  [facility] Protocol:  Consent: Verbal consent obtained    Consent given by: patient  Patient identity confirmed: verbally with patient    Supporting Documentation  Indications: pain   Procedure Details  Location: thumb - L thumb CMC  Preparation: Patient was prepped and draped in the usual sterile fashion  Needle size: 27 G  Ultrasound guidance: no  Medications administered: 0 5 mL lidocaine 1 %; 40 mg triamcinolone acetonide 40 mg/mL    Patient tolerance: patient tolerated the procedure well with no immediate complications  Dressing:  Sterile dressing applied

## 2022-10-18 ENCOUNTER — OFFICE VISIT (OUTPATIENT)
Dept: PHYSICAL THERAPY | Facility: CLINIC | Age: 82
End: 2022-10-18
Payer: MEDICARE

## 2022-10-18 DIAGNOSIS — M54.16 LUMBAR RADICULOPATHY: Primary | ICD-10-CM

## 2022-10-18 PROCEDURE — 97161 PT EVAL LOW COMPLEX 20 MIN: CPT

## 2022-10-18 NOTE — PROGRESS NOTES
PT Evaluation     Today's date: 10/18/2022  Patient name: Marlys Jung  : 1940  MRN: [id number]  Referring provider: Jessica simmons  provider found--Direct Access evaluation  Dx:   Encounter Diagnosis     ICD-10-CM    1  Lumbar radiculopathy  M54 16        Start Time:   Stop Time:   Total time in clinic (min): 45 minutes    Assessment  Assessment details: Marlys Jung is a 78 y o  female who presents with gluteal pain with pain, decreased strength, and decreased joint mobility  Due to these impairments, patient has difficulty performing recreational activities that involve sitting, due to increased pain  Patient's clinical presentation is consistent with their referring diagnosis of Lumbar radiculopathy  (primary encounter diagnosis) with secondary considerations of piriformis tightness and R LE weakness  Patient has been educated in home exercise program and plan of care as well as getting up more frequently when sitting  Patient would benefit from skilled physical therapy services to address their aforementioned functional limitations and progress towards prior level of function and independence with home exercise program and self symptom management/resolution  Impairments: abnormal or restricted ROM, activity intolerance, impaired physical strength, lacks appropriate home exercise program, pain with function, weight-bearing intolerance, poor posture  and poor body mechanics  Other impairment: poor tolerance to prolonged sitting position  Functional limitations: limited tolerance to sittingUnderstanding of Dx/Px/POC: good   Prognosis: good    Goals  Short Term Goals: Target Date 11/15/22 (4 weeks)  1  Initiate and advance HEP toward self symptom reduction/resolution  2  Improve postural awareness and demonstrate self postural correction  3  Improve AROM lumbar spine to min baez or better t/o   4  Pt to tolerate prolonged sitting/standing x 1 hour or more w/o c/o  Long Term Goals:   Target Date 22 (8 weeks)  1  Indep with HEP and self symptom prevention  2  Achieve lumbar AROM to WNL w/o c/o   3  Consistently performing HEP for stretching and strengthening  4  Using treadmill consistently for exercise 3-5x per week  Plan  Patient would benefit from: skilled PT and PT eval  Planned modality interventions: thermotherapy: hydrocollator packs and unattended electrical stimulation  Planned therapy interventions: joint mobilization, patient education, postural training, abdominal trunk stabilization, functional ROM exercises, home exercise program, neuromuscular re-education, strengthening, stretching, therapeutic activities, therapeutic exercise, manual therapy and flexibility  Frequency: 2x week  Duration in weeks: 8  Plan of Care beginning date: 10/18/2022  Plan of Care expiration date: 2022  Treatment plan discussed with: patient        Subjective Evaluation    History of Present Illness  Onset date: Return of L hip/buttock pain ~2 months ago  Mechanism of injury: Patient reports onset of L buttock/hip pain when sitting do to jigsaw puzzles at home without any noted cause  Notes she has not been continuing with exercises from prior episode of therapy  Patient arrives as a direct access evaluation to outpatient PT             Recurrent probem    Pain  Current pain ratin  At best pain ratin (when standing)  At worst pain ratin  Location: L buttock   Quality: dull ache (sore)  Relieving factors: change in position  Aggravating factors: sitting  Progression: no change    Social Support  Steps to enter house: yes  4  Stairs in house: no   Lives in: [place]  Lives with: adult children (son)    Employment status: not working    Diagnostic Tests  No diagnostic tests performed  Treatments  Previous treatment: physical therapy  Current treatment: physical therapy  Patient Goals  Patient goals for therapy: decreased pain  Patient goal: Create exercise routine        Objective  Lumbar ROM:  Flexion: min-mod limitation  Extension: mod limitation  Sideglide L: nil limitation R: nil limitation    ROM:   L   R  Hip flexion WFL  Hip rotation: Min-mod limitation in left hip IR/ER,  Right Hip IR/ER       MMT:    L   R  Hip flexion(SLR) 4+/5   4/5  Hip abduction   4/5   4+/5  Hip extension  4+/5   4/5  Knee flexion (prone)   4+/5   4+/5  Knee extension 4+/5   4+/5  Ankle dorsiflexion 4+/5   4+/5  Ankle inversion  4+/5   4+/5  Ankle eversion  4+/5   4+/5    Repeated movement testing:   Extension in standing 1x10, decreased pain from 5 to 2-3/10  Second 10, dec to 2/10 w/ trial of sitting  Piriformis stretch: 20"x3 R/L: dec pain to 1/10     Transfers: Independent with arising from chair  Independent with bed mobility and rolling  Gait: Mild antalgic gait with decreased stance time on R LE  Precautions: lumbar  Daily Exercise Log:    Date 10/18/22        Visit # 1                 Manual                                             Neuro Re-Ed         Sit to stand         Sciatic nerve glide         bridging         [facility] marching                                    Ther Ex         Piriformis stretch 2 ways-HEP        Std hip abd HEP        Std hip ext HEP        TM walk                                             Ther Activity                           Gait Training                           Modalities                           HEP:   Access Code: [email or web address]  URL: [email or web address]  Date: 10/18/2022  Prepared by: Pratima Peraza    Exercises  · Supine Figure 4 Piriformis Stretch - 2 x daily - 7 x weekly - 1 sets - 3 reps - 15 second hold  · Seated Figure 4 Piriformis Stretch - 2 x daily - 7 x weekly - 1 sets - 3 reps - 20 second hold  · Standing Hip Abduction with Counter Support - 7 x weekly - 2 sets - 10 reps  · Standing Hip Extension with Counter Support - 7 x weekly - 2 sets - 10 reps

## 2022-10-18 NOTE — LETTER
2022    MD [phone]    Patient: Dante Matthew   YOB: 1940   Date of Visit: 10/18/2022     Encounter Diagnosis     ICD-10-CM    1  Lumbar radiculopathy  M54 16        Dear Dr Roque Beatty: Your patient  Dante Matthew came to our office today for a direct access PT evaluation for L hip/lower back pain that she is experiencing  Please review the attached evaluation summary from Priya's recent visit  She has been seen here in PT over a year ago for a similar issue, and would benefit from skilled PT at this time, as outlined in the plan below  Please verify that you agree with the plan of care by signing the attached order  If you have any questions or concerns, please do not hesitate to call  I sincerely appreciate the opportunity to share in the care of one of your patients and hope to have another opportunity to work with you in the near future  Sincerely,    Chika Rouse, PT      Referring Provider:      I certify that I have read the below Plan of Care and certify the need for these services furnished under this plan of treatment while under my care  MD [phone]  Via Mail          PT Evaluation     Today's date: 10/18/2022  Patient name: Dante Matthew  : 1940  MRN: [id number]  Referring provider: No ref  provider found--Direct Access evaluation  Dx:   Encounter Diagnosis     ICD-10-CM    1  Lumbar radiculopathy  M54 16        Start Time: 9550  Stop Time: 1100  Total time in clinic (min): 45 minutes    Assessment  Assessment details: Dante Matthew is a 78 y o  female who presents with gluteal pain with pain, decreased strength, and decreased joint mobility  Due to these impairments, patient has difficulty performing recreational activities that involve sitting, due to increased pain   Patient's clinical presentation is consistent with their referring diagnosis of Lumbar radiculopathy  (primary encounter diagnosis) with secondary considerations of piriformis tightness and R LE weakness  Patient has been educated in home exercise program and plan of care as well as getting up more frequently when sitting  Patient would benefit from skilled physical therapy services to address their aforementioned functional limitations and progress towards prior level of function and independence with home exercise program and self symptom management/resolution  Impairments: abnormal or restricted ROM, activity intolerance, impaired physical strength, lacks appropriate home exercise program, pain with function, weight-bearing intolerance, poor posture  and poor body mechanics  Other impairment: poor tolerance to prolonged sitting position  Functional limitations: limited tolerance to sittingUnderstanding of Dx/Px/POC: good   Prognosis: good    Goals  Short Term Goals: Target Date 11/15/22 (4 weeks)  1  Initiate and advance HEP toward self symptom reduction/resolution  2  Improve postural awareness and demonstrate self postural correction  3  Improve AROM lumbar spine to min baez or better t/o   4  Pt to tolerate prolonged sitting/standing x 1 hour or more w/o c/o  Long Term Goals: Target Date 12/13/22 (8 weeks)  1  Indep with HEP and self symptom prevention  2  Achieve lumbar AROM to WNL w/o c/o   3  Consistently performing HEP for stretching and strengthening  4  Using treadmill consistently for exercise 3-5x per week        Plan  Patient would benefit from: skilled PT and PT eval  Planned modality interventions: thermotherapy: hydrocollator packs and unattended electrical stimulation  Planned therapy interventions: joint mobilization, patient education, postural training, abdominal trunk stabilization, functional ROM exercises, home exercise program, neuromuscular re-education, strengthening, stretching, therapeutic activities, therapeutic exercise, manual therapy and flexibility  Frequency: 2x week  Duration in weeks: 8  Plan of Care beginning date: 10/18/2022  Plan of Care expiration date: 2022  Treatment plan discussed with: patient        Subjective Evaluation    History of Present Illness  Onset date: Return of L hip/buttock pain ~2 months ago  Mechanism of injury: Patient reports onset of L buttock/hip pain when sitting do to jigsaw puzzles at home without any noted cause  Notes she has not been continuing with exercises from prior episode of therapy  Patient arrives as a direct access evaluation to outpatient PT  Recurrent probem    Pain  Current pain ratin  At best pain ratin (when standing)  At worst pain ratin  Location: L buttock   Quality: dull ache (sore)  Relieving factors: change in position  Aggravating factors: sitting  Progression: no change    Social Support  Steps to enter house: yes  4  Stairs in house: no   Lives in: [employer] [facility] [place]  Lives with: adult children (son)    Employment status: not working    Diagnostic Tests  No diagnostic tests performed  Treatments  Previous treatment: physical therapy  Current treatment: physical therapy  Patient Goals  Patient goals for therapy: decreased pain  Patient goal: Create exercise routine        Objective  Lumbar ROM:  Flexion: min-mod limitation  Extension: mod limitation  Sideglide L: nil limitation R: nil limitation    ROM:   L   R  Hip flexion WFL  Hip rotation: Min-mod limitation in left hip IR/ER,  Right Hip IR/ER       MMT:    L   R  Hip flexion(SLR) 4+/5   4/5  Hip abduction   4/5   4+/5  Hip extension  4+/5   4/5  Knee flexion (prone)   4+/5   4+/5  Knee extension 4+/5   4+/5  Ankle dorsiflexion 4+/5   4+/5  Ankle inversion  4+/5   4+/5  Ankle eversion  4+/5   4+/5    Repeated movement testing:   Extension in standing 1x10, decreased pain from 5 to 2-3/10  Second 10, dec to 2/10 w/ trial of sitting     Piriformis stretch: 20"x3 R/L: dec pain to 1/10     Transfers: Independent with arising from chair  Independent with bed mobility and rolling  Gait: Mild antalgic gait with decreased stance time on R LE  Precautions: lumbar  Daily Exercise Log:    Date 10/18/22        Visit # 1                 Manual                                             Neuro Re-Ed         Sit to stand         Sciatic nerve glide         bridging         [facility]         High marching                                    Ther Ex         Piriformis stretch 2 ways-HEP        Std hip abd HEP        Std hip ext HEP        TM walk                                             Ther Activity                           Gait Training                           Modalities                           HEP:   Access Code: [email or web address]  URL: [email or web address]  Date: 10/18/2022  Prepared by: Andres Roy    Exercises  · Supine Figure 4 Piriformis Stretch - 2 x daily - 7 x weekly - 1 sets - 3 reps - 15 second hold  · Seated Figure 4 Piriformis Stretch - 2 x daily - 7 x weekly - 1 sets - 3 reps - 20 second hold  · Standing Hip Abduction with Counter Support - 7 x weekly - 2 sets - 10 reps  · Standing Hip Extension with Counter Support - 7 x weekly - 2 sets - 10 reps

## 2022-10-24 ENCOUNTER — TELEPHONE (OUTPATIENT)
Dept: PHYSICAL THERAPY | Facility: CLINIC | Age: 82
End: 2022-10-24

## 2022-10-27 ENCOUNTER — OFFICE VISIT (OUTPATIENT)
Dept: PHYSICAL THERAPY | Facility: CLINIC | Age: 82
End: 2022-10-27
Payer: MEDICARE

## 2022-10-27 DIAGNOSIS — M54.16 LUMBAR RADICULOPATHY: Primary | ICD-10-CM

## 2022-10-27 PROCEDURE — 97110 THERAPEUTIC EXERCISES: CPT

## 2022-10-27 PROCEDURE — 97112 NEUROMUSCULAR REEDUCATION: CPT

## 2022-10-27 NOTE — PROGRESS NOTES
Daily Note     Today's date: 10/27/2022  Patient name: Neftaly Kilgore  : 1940  MRN: [id number]  Referring provider: Alexi Dickey MD  Dx:   Encounter Diagnosis     ICD-10-CM    1  Lumbar radiculopathy  M54 16                   Subjective: pt reports she has been doing her exercises probably 1x/day but not 2x  She thinks things are better butppresents to session with Pain 3/10 in L butt cheek        Objective: See treatment diary below      Assessment: Tolerated treatment well  Pt dem decreased L buttock pain with LUCILA w/ B/L knee flex, HEP updated to reflect this and standing BRITTANY  Pt edu to cont to decrease her sitting to no more than 20 min at a time  Patient would benefit from continued PT      Plan: Continue per plan of care  Precautions: lumbar  Daily Exercise Log:    Date 10/18/22 10/2/2022       Visit # 1 2                 Manual                                             Neuro Re-Ed  10'        Sit to stand         Sciatic nerve glide  15x R/L        bridging  2x10        clamshells         High marching                                    Ther Ex  30'        LUCILA   30"x3     W/ B/L knee flex 2x10        BRITTANY   1x10        Piriformis stretch 2 ways-HEP Supine figure 4 stretch 20"x3 R/L        Std hip abd HEP        Std hip ext HEP        TM walk                                    Pt edu   10'        Ther Activity                           Gait Training                           Modalities                           Access Code: [email or web address]  URL: [email or web address]  Date: 10/27/2022  Prepared by: Monalisa Murphy    Exercises  · Supine Figure 4 Piriformis Stretch - 2 x daily - 7 x weekly - 1 sets - 3 reps - 15 second hold  · Seated Figure 4 Piriformis Stretch - 2 x daily - 7 x weekly - 1 sets - 3 reps - 20 second hold  · Standing Hip Abduction with Counter Support - 7 x weekly - 2 sets - 10 reps  · Standing Hip Extension with Counter Support - 7 x weekly - 2 sets - 10 reps  · Static Prone on Elbows - 2 x daily - 7 x weekly - 2 sets - 10 reps  · Standing Lumbar Extension with Counter - 1 x daily - 7 x weekly - 5 sets - 10 reps

## 2022-10-31 ENCOUNTER — OFFICE VISIT (OUTPATIENT)
Dept: OBGYN CLINIC | Facility: CLINIC | Age: 82
End: 2022-10-31

## 2022-10-31 VITALS
HEIGHT: 66 IN | DIASTOLIC BLOOD PRESSURE: 70 MMHG | HEART RATE: 60 BPM | BODY MASS INDEX: 29.73 KG/M2 | WEIGHT: 185 LBS | SYSTOLIC BLOOD PRESSURE: 150 MMHG

## 2022-10-31 DIAGNOSIS — M18.12 ARTHRITIS OF CARPOMETACARPAL (CMC) JOINT OF LEFT THUMB: Primary | ICD-10-CM

## 2022-10-31 RX ORDER — NITROFURANTOIN MACROCRYSTALS 100 MG/1
CAPSULE ORAL
COMMUNITY
Start: 2022-08-06

## 2022-10-31 RX ORDER — LIDOCAINE HYDROCHLORIDE 10 MG/ML
0.5 INJECTION, SOLUTION INFILTRATION; PERINEURAL
Status: COMPLETED | OUTPATIENT
Start: 2022-10-31 | End: 2022-10-31

## 2022-10-31 RX ORDER — TRIAMCINOLONE ACETONIDE 40 MG/ML
20 INJECTION, SUSPENSION INTRA-ARTICULAR; INTRAMUSCULAR
Status: COMPLETED | OUTPATIENT
Start: 2022-10-31 | End: 2022-10-31

## 2022-10-31 RX ORDER — LOSARTAN POTASSIUM AND HYDROCHLOROTHIAZIDE 12.5; 5 MG/1; MG/1
TABLET ORAL
COMMUNITY
Start: 2022-10-26

## 2022-10-31 RX ADMIN — TRIAMCINOLONE ACETONIDE 20 MG: 40 INJECTION, SUSPENSION INTRA-ARTICULAR; INTRAMUSCULAR at 12:39

## 2022-10-31 RX ADMIN — LIDOCAINE HYDROCHLORIDE 0.5 ML: 10 INJECTION, SOLUTION INFILTRATION; PERINEURAL at 12:39

## 2022-10-31 NOTE — PROGRESS NOTES
Assessment/Plan:  1  Arthritis of carpometacarpal (CMC) joint of left thumb  Small joint arthrocentesis: L thumb CMC     Scribe Attestation    I,:  Andreasrajat Moni am acting as a scribe while in the presence of the attending physician :       I,:  Yadiel Bazan MD personally performed the services described in this documentation    as scribed in my presence :             Upon examination and review the x-rays of the left thumb obtained in July of 2021 does demonstrate severe carpometacarpal joint osteoarthritis of the thumb  She does demonstrate functional strength on exam with a positive CMC grind test   Her pain is related to the osteoarthritis  I did note that she should try to space the steroid injections to at a minimum of 5 months that she has had multiple injections in this area  I did note that she could consider a left thumb carpometacarpal joint arthroplasty  However, she would like to abstain from surgical intervention at this time  I did discuss risks and benefits of repeat steroid injection today  She was amenable to this and tolerated the injection well with no complications  Post injection instructions were provided  She may follow up in 5 months time if she wishes to consider repeat steroid injection or surgical intervention  Small joint arthrocentesis: L thumb CMC  [place] Protocol:  Consent: Verbal consent obtained  Risks and benefits: risks, benefits and alternatives were discussed  Consent given by: patient  Time out: Immediately prior to procedure a "time out" was called to verify the correct patient, procedure, equipment, support staff and site/side marked as required    Timeout called at: 10/31/2022 12:37 PM   Patient understanding: patient states understanding of the procedure being performed  Site marked: the operative site was marked  Patient identity confirmed: verbally with patient    Supporting Documentation  Indications: pain   Procedure Details  Location: thumb - Pediatric Endocrinology Consultation    Rj Chirinos MRN# [id number]   YOB: 2010 Age: 8 year old   Date of Admission: 5/28/2019     Reason for consult: I was asked by Dr. Galeano to evaluate this patient for type 1 diabetes mellitus.           Assessment and Plan:   Rj Chirinos is a 8 year old male seen by pediatric endocrinology for management of his type 1 diabetes during his current admission for chest pain with concerns for myocarditis vs pericarditis.     Patient and his Mom state his BG is well controlled at this time, and that patient spends a majority of the time in auto-mode. Patient has had no recent lows, and has had minimal issues with the pump site and sensor site. Mom demonstrates adequate knowledge to safely manage his insulin pump.   Will continue his home insulin regimen including keeping him in auto mode. This is an augmented setting where his pump automates part of his insulin delivery to maintain his BG levels in his target range.  This includes alterations to his basal rates on a constant basis.  He still needs to bolus for his carbs and provide corrections when his pump requests it.  If he moves out of automode, his pump will function  the same as any other insulin pump and had one basal rate and requires insulin boluses to cover carbs and correct hyperglycemia.     Recommendations:   - continue Novolog via insulin pump in auto mode  - Carb ratios:   12 A: 1U : 8 grams of carbs  10 A: 1U : 8 grams of carbs  4 P: 1U : 8 grams of carbs  - Manual Mode Insulin settings  Basal rate: 1.1 unit(s)/hr all day  Sensitivity factor: 65  Target:   - Blood glucose checks before each meal, bedtime, and 2 AM  - Continue home CGM  - Patient is due to pump site change today, Grandma to bring in supplies from home  - Will continue to monitor and adjust insulin regimen while patient remains in hospital  - Send A1c with labs tomorrow.    Patient discussed with Pediatric Endocrinology Attending   "Robbie. All questions and concerns were addressed.    Thank you for allowing us to participate in Rj's care. Please feel free to page us with any additional questions.    [place],   Pediatric Endocrinology Fellow  [facility]  Pager: [phone]    Physician Attestation   I, Juanpablo Avalos, saw this patient with the resident and agree with the resident/fellow's findings and plan of care as documented in the note.      I personally reviewed vital signs, medications and labs    Juanpablo Avalos MD  Date of Service (when I saw the patient): 05/28/19            Chief Complaint/ HPI:   Rj Chirinos is a 8 year old male seen by pediatric endocrinology for management of his diabetes during his current admission for chest pain with concerns for myocarditis vs pericarditis (per Cards note).     Patient has type 1 diabetes that is currently controlled with Medtronic 670G pump and Guardian 3 CGM. Patient and his Mom state his BG is well controlled at this time, and that patient spends a majority of the time in auto-mode. Patient has had no recent lows, patient starts feeling symptoms of hypoglycemia around 80, but his sensor is set to alarm at 90 or lower. Have had no issues with \"suspend before low\" features of sensor and pump. Patient's last visit with endocrinology was in April, follows with Dr. Daly. Mom states only manual adjustment they have made with current pump settings have been to raise his target BG to , as patient starts feeling symptomatic at 80 or lower.     Patient has his pump site on his abdomen, sensor on right arm. States he changes these every 3 days. He reports last changing his pump site on 5/26, mom and patient are unsure when sensor was last changed but think it was Saturday 5/25.    Patient has 3 year history of type 1 DM, thyroid dysfunction and celiac disease on the maternal side of the family (see below for more details). He has no family history of " L thumb CMC  Needle size: 25 G  Ultrasound guidance: no  Approach: volar  Medications administered: 0 5 mL lidocaine 1 %; 20 mg triamcinolone acetonide 40 mg/mL    Patient tolerance: patient tolerated the procedure well with no immediate complications  Dressing:  Sterile dressing applied          Subjective:   Bryson Aguilera is a 78 y o  female who presents for evaluation of her left thumb she does have a history of osteoarthritis that has been treated non operatively with steroid injections previously by Dr Jurgen Gutierrez  Her last steroid injection was provided on 5/27/2022  This did provide her with great symptomatic relief  She states she has an increase in pain with pinching and grasping activities  She states her pain is better while at rest   She states that she has days where she is more symptomatic than others  She denies any distal paresthesias  She also denies any recent traumatic injury that has further exacerbated her pain  She does wish to consider a repeat steroid injection  She has had 8 previous steroid injections into the [facility] joint dating back to October 2019  Review of Systems   Constitutional: Negative for chills, fever and unexpected weight change  HENT: Negative for hearing loss, nosebleeds and sore throat  Eyes: Negative for pain, redness and visual disturbance  Respiratory: Negative for cough, shortness of breath and wheezing  Cardiovascular: Negative for chest pain, palpitations and leg swelling  Gastrointestinal: Negative for abdominal pain, nausea and vomiting  Endocrine: Negative for polydipsia and polyuria  Genitourinary: Negative for dysuria and hematuria  Musculoskeletal: Positive for arthralgias, joint swelling and myalgias  See HPI   Skin: Negative for rash and wound  Neurological: Negative for dizziness, numbness and headaches  Psychiatric/Behavioral: Negative for decreased concentration and suicidal ideas  The patient is not nervous/anxious  Past Medical History:   Diagnosis Date   • Hypertension        Past Surgical History:   Procedure Laterality Date   • BLADDER SURGERY     • CATARACT EXTRACTION Bilateral    • RETINAL DETACHMENT SURGERY Bilateral        Family History   Problem Relation Age of Onset   • No Known Problems Mother    • No Known Problems Father    • No Known Problems Sister    • No Known Problems Brother    • No Known Problems Maternal Aunt    • No Known Problems Maternal Uncle    • No Known Problems Paternal Aunt    • No Known Problems Paternal Uncle    • No Known Problems Maternal Grandmother    • No Known Problems Maternal Grandfather    • No Known Problems Paternal Grandmother    • No Known Problems Paternal Grandfather        Social History     Occupational History   • Not on file   Tobacco Use   • Smoking status: Never Smoker   • Smokeless tobacco: Never Used   Substance and Sexual Activity   • Alcohol use: Never   • Drug use: Never   • Sexual activity: Not on file         Current Outpatient Medications:   •  Cholecalciferol (Vitamin D3) 1 25 MG (96514 UT) TABS, Take by mouth, Disp: , Rfl:   •  losartan-hydrochlorothiazide (HYZAAR) 50-12 5 mg per tablet, , Disp: , Rfl:   •  Diclofenac Sodium (VOLTAREN) 1 %, Apply 2 g topically 4 (four) times a day (Patient not taking: No sig reported), Disp: 1 Tube, Rfl: 0  •  hydrochlorothiazide (MICROZIDE) 12 5 mg capsule, Take 12 5 mg by mouth daily (Patient not taking: Reported on 10/31/2022), Disp: , Rfl:   •  nitrofurantoin (MACRODANTIN) 100 mg capsule, , Disp: , Rfl:     No Known Allergies    Objective:  Vitals:    10/31/22 1143   BP: 150/70   Pulse: 60       Left Hand Exam     Tenderness   Left hand tenderness location: CMC of the thumb  Range of Motion   Wrist   Extension: 50   Flexion: 90   Pronation: 90   Supination: 90     Muscle Strength   Left wrist normal muscle strength: APB: 4/5, FPL: 4/5      Other   Erythema: absent  Scars: absent  Sensation: normal  Pulse: adrenal insufficiency or IBD.     Primary endocrinologist: Dr. Daly at [facility].  Diagnosed in 2015 at age           Past Medical History:   Type 1 diabetes mellitus   ADD          Past Surgical History:   Adenoidectomy  Tonsillectomy            Social History:   Lives at home with Mom, Dad, and 3 brothers. Oldest. In 5th grade.   Recently got back from [employer] camp  Spends time with grandparents in [place] over summertime    Social History     Tobacco Use     Smoking status: Not on file   Substance Use Topics     Alcohol use: Not on file             Family History:       History of:  Adrenal insufficiency: none  Autoimmune disease: Celiac in maternal great aunts (x2).  Calcium problems: none.  Delayed puberty: none.  Diabetes mellitus: T1DM in maternal grandmother, maternal uncle, maternal aunt  Early puberty: none.   Genetic disease: none.  Short stature: none.  Thyroid disease: Thyroid dysfunction in maternal aunt, paternal aunt         Allergies:     Allergies   Allergen Reactions     Adhesive Tape Rash             Medications:     Medications Prior to Admission   Medication Sig Dispense Refill Last Dose     methylphenidate (CONCERTA) 27 MG CR tablet Take 27 mg by mouth           Current Facility-Administered Medications   Medication     acetaminophen (TYLENOL) tablet 650 mg     glucose gel 15-30 g    Or     dextrose 50 % injection 25-50 mL    Or     glucagon injection 1 mg     insulin aspart (NovoLOG/FIASP) 100 UNIT/ML VIAL FOR FILLING PUMP RESERVOIR     insulin basal rate from AMBULATORY PUMP     insulin bolus from AMBULATORY PUMP     insulin bolus from AMBULATORY PUMP     insulin bolus from AMBULATORY PUMP     ondansetron (ZOFRAN-ODT) ODT tab 4 mg            Review of Systems:   ROS:   General: normal energy, low grade fever  Head: no headache   ENT: no rhinorrhea, sore throat  Resp: no wheeze or SOB  CV: chest pain, elevated troponins concering for myocarditis  Abdom: nausea, emesis,  "diarrhea  Extremities: no edema  Skin: neg  Endo: see above           Physical Exam:   Blood pressure 117/69, temperature 99.3  F (37.4  C), temperature source Oral, resp. rate 18, height 1.549 m (5' 1\"), weight 62.6 kg (138 lb 0.1 oz), SpO2 98 %.  Exam:  Constitutional: awake and alert in NAD  Head:Normocephalic.   Neck: Neck supple. Thyroid symmetric, normal size  ENT: MMM, external ear exam within normal limits  Cardiovascular: RRR, no murmurs appreciated  Respiratory: Lungs clear bilaterally. No increased WOB  Gastrointestinal: normal bowel sounds, soft, nontender, nondistended  : Not assessed  Musculoskeletal: no deformities  Skin: no rashes, no lipohypertrophy, pump site intact, located on right lower abdomen, CGM sensor on left upper arm  Neurologic: grossly intact  Psychiatric: appropriate mood and affect         Labs:     Results for orders placed or performed during the hospital encounter of 05/28/19 (from the past 24 hour(s))   Troponin I   Result Value Ref Range    Troponin I ES 2.170 (HH) 0.000 - 0.045 ug/L   Basic metabolic panel   Result Value Ref Range    Sodium 136 133 - 143 mmol/L    Potassium 3.7 3.4 - 5.3 mmol/L    Chloride 108 98 - 110 mmol/L    Carbon Dioxide 20 20 - 32 mmol/L    Anion Gap 8 3 - 14 mmol/L    Glucose 122 (H) 70 - 99 mg/dL    Urea Nitrogen 7 7 - 21 mg/dL    Creatinine 0.51 0.39 - 0.73 mg/dL    GFR Estimate GFR not calculated, patient <18 years old. >60 mL/min/[1.73_m2]    GFR Estimate If Black GFR not calculated, patient <18 years old. >60 mL/min/[1.73_m2]    Calcium 7.7 (L) 9.1 - 10.3 mg/dL   Nt probnp inpatient   Result Value Ref Range    N-Terminal Pro BNP Inpatient 162 0 - 240 pg/mL     Recent Labs   Lab 05/28/19  1317 05/28/19  0613   * 248*           " present    Comments:  [facility] grind test:  Positive            Physical Exam  Vitals reviewed  HENT:      Head: Normocephalic and atraumatic  Eyes:      General:         Right eye: No discharge  Left eye: No discharge  Conjunctiva/sclera: Conjunctivae normal       Pupils: Pupils are equal, round, and reactive to light  Cardiovascular:      Rate and Rhythm: Normal rate  Pulmonary:      Effort: Pulmonary effort is normal  No respiratory distress  Musculoskeletal:      Cervical back: Normal range of motion and neck supple  Comments: As noted in HPI   Skin:     General: Skin is warm and dry  Neurological:      Mental Status: She is alert and oriented to person, place, and time  I have personally reviewed pertinent films in PACS and my interpretation is as follows:  X-rays of the left thumb obtained in July 2021 demonstrate severe carpometacarpal joint osteoarthritis at the thumb  Large osteophytes and significant joint line narrowing is appreciated  No acute fractures demonstrated  No obvious lytic or blastic lesions  This document was created using speech voice recognition software  Grammatical errors, random word insertions, pronoun errors, and incomplete sentences are an occasional consequence of this system due to software limitations, ambient noise, and hardware issues  Any formal questions or concerns about content, text, or information contained within the body of this dictation should be directly addressed to the provider for clarification

## 2022-11-03 ENCOUNTER — OFFICE VISIT (OUTPATIENT)
Dept: PHYSICAL THERAPY | Facility: CLINIC | Age: 82
End: 2022-11-03

## 2022-11-03 DIAGNOSIS — M54.16 LUMBAR RADICULOPATHY: Primary | ICD-10-CM

## 2022-11-03 NOTE — PROGRESS NOTES
Daily Note     Today's date: 11/3/2022  Patient name: Trevor Walters  : 1940  MRN: [id number]  Referring provider: Aamir Hernandez MD  Dx:   Encounter Diagnosis     ICD-10-CM    1  Lumbar radiculopathy  M54 16        Start Time: 1700  Stop Time: 6099  Total time in clinic (min): 45 minutes    Subjective: Patient reports continued soreness in her L butt cheek  Improved from start of care, but not gone yet  Notes doing her exercises only at the end of the day  Objective: See treatment diary below      Assessment: Tolerated treatment well and with improved understanding of updated HEP demonstrated  Patient noted some improvement with targeted stretching for hamstrings during today's session, with continued assessment and progression of exercises required to abolish patient's pain and allow for her independent management of symptoms  Patient demonstrated fatigue post treatment, exhibited good technique with therapeutic exercises and would benefit from continued PT      Plan: Continue per plan of care  Progress treatment as tolerated         Precautions: lumbar  Daily Exercise Log:  POC expires   Date 10/18/22 10/27/2022 11/3/22      Visit # 1 2  3               Manual         STM to HS insertion   LS 5'                                 Neuro Re-Ed  10'        Sit to stand         Sciatic nerve glide  15x R/L        bridging  2x10        clamshells         High marching                                    Ther Ex  30'  35'      LUCILA   30"x3     W/ B/L knee flex 2x10  30"x3    W/ B/L knee flx 2x10      BRITTANY   1x10  1x10       Piriformis stretch 2 ways-HEP Supine figure 4 stretch 20"x3 R/L  Supine fig 4 stretch 20"x3 R/L      Std hip abd HEP        Std hip ext HEP        TM walk         Prone quad stretch   3x20" R/L      Seated HS stretch   20"x3 R/L               Pt edu   10'  10' reviewed ext based exercises, HEP, which exercises and how often to complete      Ther Activity                           Gait Training Modalities                           HEP:  Access Code: [email or web address]  URL: [email or web address]  Updated: 11/03/2022  Prepared by: Summer Diego    Exercises  · Seated Hamstring Stretch - 2 x daily - 7 x weekly - 1 sets - 3 reps - 20 second hold  · Static Prone on Elbows - 2 x daily - 7 x weekly - 2 sets - 10 reps  · Standing Lumbar Extension with Counter - 4-5 x daily - 7 x weekly - 1 sets - 10 reps  · Supine Figure 4 Piriformis Stretch - 2 x daily - 7 x weekly - 1 sets - 3 reps - 15 second hold  · Seated Figure 4 Piriformis Stretch - 2 x daily - 7 x weekly - 1 sets - 3 reps - 20 second hold  · Standing Hip Abduction with Counter Support - 5 x weekly - 2 sets - 10 reps  · Standing Hip Extension with Counter Support - 5 x weekly - 2 sets - 10 reps

## 2022-11-07 ENCOUNTER — OFFICE VISIT (OUTPATIENT)
Dept: PHYSICAL THERAPY | Facility: CLINIC | Age: 82
End: 2022-11-07

## 2022-11-07 DIAGNOSIS — M54.16 LUMBAR RADICULOPATHY: Primary | ICD-10-CM

## 2022-11-07 NOTE — PROGRESS NOTES
Daily Note     Today's date: 2022  Patient name: Lillian Waddell  : 1940  MRN: [id number]  Referring provider: Tono Castañeda MD  Dx:   Encounter Diagnosis     ICD-10-CM    1  Lumbar radiculopathy  M54 16                   Subjective: pt reports to session with no noted pain  Reports she has been doing her exercises daily and they seem to be helping  Objective: See treatment diary below      Assessment: Tolerated treatment well  Pt requires VC t/o session to perform exercises correctly  No increases in pain during or post session  Cont to enforce edu and indep with HEP to allow for self symptom management  Patient would benefit from continued PT      Plan: Continue per plan of care        Precautions: lumbar  Daily Exercise Log:  POC expires   Date 10/18/22 10/27/2022 11/3/22 2022     Visit # 1 2  3 4               Manual         STM to HS insertion   LS 5'                                 Neuro Re-Ed  10'   15'     Sit to stand    Low mat 2x10      Sciatic nerve glide  15x R/L   15x R/L      bridging  2x10   2x10      clamshells    YTB 2x10 R/L      High marching                                    Ther Ex  30'  35' 25'     LUCILA   30"x3     W/ B/L knee flex 2x10  30"x3    W/ B/L knee flx 2x10 1'     w B/L knee flex 2x10     Prone hip ext     1x10 R/L      BRITTANY   1x10  1x10  2x10 to end      Piriformis stretch 2 ways-HEP Supine figure 4 stretch 20"x3 R/L  Supine fig 4 stretch 20"x3 R/L Supine fig 4 stretch 20"x3 R/L      Std hip abd HEP   YTB 1x10 R/L     Std hip ext HEP   YTB 1x10 R/L     TM walk         Prone quad stretch   3x20" R/L Supine over edge 20"x3 R/L     Seated HS stretch   20"x3 R/L 20"x3 R/L               Pt edu   10'  10' reviewed ext based exercises, HEP, which exercises and how often to complete      Ther Activity                           Gait Training                           Modalities                           HEP:  Access Code: [email or web address]  URL: [email or web address]  Updated: 11/03/2022  Prepared by: Danny Primrose    Exercises  · Seated Hamstring Stretch - 2 x daily - 7 x weekly - 1 sets - 3 reps - 20 second hold  · Static Prone on Elbows - 2 x daily - 7 x weekly - 2 sets - 10 reps  · Standing Lumbar Extension with Counter - 4-5 x daily - 7 x weekly - 1 sets - 10 reps  · Supine Figure 4 Piriformis Stretch - 2 x daily - 7 x weekly - 1 sets - 3 reps - 15 second hold  · Seated Figure 4 Piriformis Stretch - 2 x daily - 7 x weekly - 1 sets - 3 reps - 20 second hold  · Standing Hip Abduction with Counter Support - 5 x weekly - 2 sets - 10 reps  · Standing Hip Extension with Counter Support - 5 x weekly - 2 sets - 10 reps

## 2022-11-10 ENCOUNTER — OFFICE VISIT (OUTPATIENT)
Dept: PHYSICAL THERAPY | Facility: CLINIC | Age: 82
End: 2022-11-10

## 2022-11-10 DIAGNOSIS — M54.16 LUMBAR RADICULOPATHY: Primary | ICD-10-CM

## 2022-11-10 NOTE — PROGRESS NOTES
Daily Note     Today's date: 11/10/2022  Patient name: Rita Magaña  : 1940  MRN: [id number]  Referring provider: Carlos Aleman MD  Dx:   Encounter Diagnosis     ICD-10-CM    1  Lumbar radiculopathy  M54 16                   Subjective: pt reports she felt a cold sensation on the L side and maybe the R glute when she was sitting down on the toilet a couple times, not sure if she noticed it today  Pt reports that the sore spot is improving and has only really noticed any pain one day since starting PT after some progressions in her session and she fell asleep in the recliner  Reports she has only been doing her BRITTANY 1x/day before bedtime  Objective: See treatment diary below      Assessment: Tolerated treatment well  Pt given extensive edu on the importance of increased freq of BRITTANY to assess her response to this, compliance with HEP, and personal reflection on how she is feeling with these activities  Pt does not dem any type of clear distinction of her symptoms or her response to her current HEP with minimal compliance however voices she only noticed pain one day so far but unsure if she had pain any other days or not  Pt edu she needs to keep track of how she is feeling in response to BRITTANY and increase this frequency  Patient would benefit from continued PT      Plan: Continue per plan of care        Precautions: lumbar  Daily Exercise Log:  POC expires   Date 10/18/22 10/27/2022 11/3/22 2022 11/10/2022    Visit # 1 2  3 4  5 FOTO              Manual         STM to HS insertion   LS 5'                                 Neuro Re-Ed  10'   15'     Sit to stand    Low mat 2x10      Sciatic nerve glide  15x R/L   15x R/L  15x R/L     bridging  2x10   2x10  2x10     clamshells    YTB 2x10 R/L      High marching                                    Ther Ex  30'  35' 25'     LUCILA   30"x3     W/ B/L knee flex 2x10  30"x3    W/ B/L knee flx 2x10 1'     w B/L knee flex 2x10     Prone hip ext     1x10 R/L      BRITTANY 1x10  1x10  2x10 to end      Piriformis stretch 2 ways-HEP Supine figure 4 stretch 20"x3 R/L  Supine fig 4 stretch 20"x3 R/L Supine fig 4 stretch 20"x3 R/L      Std hip abd HEP   YTB 1x10 R/L     Std hip ext HEP   YTB 1x10 R/L     TM walk         Prone quad stretch   3x20" R/L Supine over edge 20"x3 R/L     Seated HS stretch   20"x3 R/L 20"x3 R/L  20"x3 R/L               Pt edu   10'  10' reviewed ext based exercises, HEP, which exercises and how often to complete  20'     Ther Activity                           Gait Training                           Modalities                           HEP:  Access Code: [email or web address]  URL: [email or web address]  Updated: 11/03/2022  Prepared by: Casey Castanon    Exercises  · Seated Hamstring Stretch - 2 x daily - 7 x weekly - 1 sets - 3 reps - 20 second hold  · Static Prone on Elbows - 2 x daily - 7 x weekly - 2 sets - 10 reps  · Standing Lumbar Extension with Counter - 4-5 x daily - 7 x weekly - 1 sets - 10 reps  · Supine Figure 4 Piriformis Stretch - 2 x daily - 7 x weekly - 1 sets - 3 reps - 15 second hold  · Seated Figure 4 Piriformis Stretch - 2 x daily - 7 x weekly - 1 sets - 3 reps - 20 second hold  · Standing Hip Abduction with Counter Support - 5 x weekly - 2 sets - 10 reps  · Standing Hip Extension with Counter Support - 5 x weekly - 2 sets - 10 reps

## 2022-11-14 ENCOUNTER — EVALUATION (OUTPATIENT)
Dept: PHYSICAL THERAPY | Facility: CLINIC | Age: 82
End: 2022-11-14

## 2022-11-14 DIAGNOSIS — M54.16 LUMBAR RADICULOPATHY: Primary | ICD-10-CM

## 2022-11-14 NOTE — PROGRESS NOTES
PT Re-Evaluation     Today's date: 2022  Patient name: Angela Holloway  : 1940  MRN: [id number]  Referring provider: Weston Wright MD--Direct Access evaluation  Dx:   Encounter Diagnosis     ICD-10-CM    1  Lumbar radiculopathy  M54 16        Start Time: 1700  Stop Time: 4829  Total time in clinic (min): 45 minutes    Assessment  Assessment details: 22: Patient demonstrating abolishment of gluteal pain for >1 week at this time, with continued soreness across lumbar spine after increasing frequency of extension in standing to 5x/day  Patient noted to respond well to adaptations made during today's session  Patient expressing desire to continue therapy with goals of abolishing back pain, however some seems to have been there for an extended period of time per pt report  Limited insight by patient into what makes her feel better and worse, with difficulty pinpointing symptoms now vs  At other times  Patient demonstrating overall improvements, with additional PT indicated to identify consistent HEP that assists with relieving pain and transitioning to HEP with good understanding of when and how to perform  Patient will require additional time due to difficulties with memory  Progress skilled PT to address impairments in strength and mobility, with increasing patient awareness and consistency of performing HEP      10/18/22: Angela Holloway is a 78 y o  female who presents with gluteal pain with pain, decreased strength, and decreased joint mobility  Due to these impairments, patient has difficulty performing recreational activities that involve sitting, due to increased pain  Patient's clinical presentation is consistent with their referring diagnosis of Lumbar radiculopathy  (primary encounter diagnosis) with secondary considerations of piriformis tightness and R LE weakness  Patient has been educated in home exercise program and plan of care as well as getting up more frequently when sitting   Patient would benefit from skilled physical therapy services to address their aforementioned functional limitations and progress towards prior level of function and independence with home exercise program and self symptom management/resolution  Impairments: impaired balance, impaired physical strength and poor body mechanics  Understanding of Dx/Px/POC: good   Prognosis: good    Goals  Short Term Goals: Target Date 11/15/22 (4 weeks)  1  Initiate and advance HEP toward self symptom reduction/resolution  MET  2  Improve postural awareness and demonstrate self postural correction  MET  3  Improve AROM lumbar spine to min baez or better t/o  NOT MET  4  Pt to tolerate prolonged sitting/standing x 1 hour or more w/o c/o  MET      Long Term Goals: Target Date 12/13/22 (8 weeks)  1  Indep with HEP and self symptom prevention  2  Achieve lumbar AROM to painfree through available ROM  3  Consistently performing HEP for stretching and strengthening  4  Using treadmill consistently for exercise 3-5x per week  Plan  Patient would benefit from: skilled PT  Planned therapy interventions: patient education, postural training, abdominal trunk stabilization, functional ROM exercises, home exercise program, neuromuscular re-education, strengthening, stretching, therapeutic activities, therapeutic exercise, manual therapy, flexibility and balance  Frequency: 2x week  Duration in weeks: 8  Plan of Care beginning date: 10/18/2022  Plan of Care expiration date: 12/13/2022  Treatment plan discussed with: patient        Subjective Evaluation    History of Present Illness  Onset date: Return of L hip/buttock pain ~2 months ago  Mechanism of injury: 11/14/22: Patient reports abolishment of buttock/hip pain with return of ability to sit without increased pain  Notes increased lumbar discomfort with increased frequency of performing lumbar extension in standing   Has more vague complaints at this time, with extended time spent attempting to identify what her complaints are specifically  AT IE: Patient reports onset of L buttock/hip pain when sitting do to jigsaw puzzles at home without any noted cause  Notes she has not been continuing with exercises from prior episode of therapy  Patient arrives as a direct access evaluation to outpatient PT  Recurrent probem    Pain  Current pain rating: 3 (after lower trunk rotations)  At best pain ratin (when standing)  At worst pain ratin  Location: L buttock   Quality: dull ache (sore)  Relieving factors: change in position  Aggravating factors: sitting  Progression: improved (no longer has buttock/hip pain)    Social Support  Steps to enter house: yes  4  Stairs in house: no   Lives in: [facility] [facility] [place]  Lives with: adult children (son)    Employment status: not working    Diagnostic Tests  No diagnostic tests performed  Treatments  Previous treatment: physical therapy  Current treatment: physical therapy  Patient Goals  Patient goals for therapy: decreased pain  Patient goal: Create exercise routine        Objective  Lumbar ROM:  Flexion: min-mod limitation  Extension: mod limitation  Sideglide L: nil limitation R: nil limitation    ROM:   L   R  Hip flexion [facility]  Hip rotation: Min-mod limitation in left hip IR/ER,  Right Hip IR/ER       MMT:    L   R    Updated 22  Hip flexion(SLR) 5/5   5/5  Hip abduction   4+/5   4+/5  Hip extension  4+/5   4/5  Knee flexion (prone)    4+/5   4+/5  Knee extension 4+/5   4+/5  Ankle dorsiflexion 4+/5   4+/5  Ankle inversion  4+/5   4+/5  Ankle eversion  4+/5   4+/5    Repeated movement testin22: Pt reporting increased lumbar soreness with repeated extension in standing  Pain in hip is abolished, but notes soreness and discomfort across lower back  Some relief with repeated lower trunk rotation left and right, without specific directional preference noted at this time     At IE:   Extension in standing 1x10, decreased pain from 5 to 2-3/10  Second 10, dec to 2/10 w/ trial of sitting  Piriformis stretch: 20"x3 R/L: dec pain to 1/10     Transfers: Independent with arising from chair  Independent with bed mobility and rolling  Gait:   11/14/22: Ambulating with improved speed, decreased antalgic gait pattern, mild trunk lean and overall increased ease of mobility  10/18/22: Mild antalgic gait with decreased stance time on R LE          Precautions: lumbar  Daily Exercise Log:  POC expires at 12/13/22  Date 10/18/22 10/27/2022 11/3/22 11/7/2022 11/10/2022 11/14/22   Visit # 1 2  3 4  5 FOTO  6 (re-eval)            Manual         STM to HS insertion   LS 5'                                 Neuro Re-Ed  10'   15'  10'   Sit to stand    Low mat 2x10   2x10 low mat   Sciatic nerve glide  15x R/L   15x R/L  15x R/L     bridging  2x10   2x10  2x10  2x10   clamshells    YTB 2x10 R/L      High marching         Lower trunk rotation      2x10                     Ther Ex  30'  35' 25'  28'   LUCILA   30"x3     W/ B/L knee flex 2x10  30"x3    W/ B/L knee flx 2x10 1'     w B/L knee flex 2x10     Prone hip ext     1x10 R/L      BRITTANY   1x10  1x10  2x10 to end   1x10 reports inc soreness in lower back, and "numbness in arms"   Piriformis stretch 2 ways-HEP Supine figure 4 stretch 20"x3 R/L  Supine fig 4 stretch 20"x3 R/L Supine fig 4 stretch 20"x3 R/L      Std hip abd HEP   YTB 1x10 R/L     Std hip ext HEP   YTB 1x10 R/L     TM walk      5' at 1 0-1 5 mph w/ dec pain   Prone quad stretch   3x20" R/L Supine over edge 20"x3 R/L     Seated HS stretch   20"x3 R/L 20"x3 R/L  20"x3 R/L      Lower trunk rotation      15x R/L 5" hold each side   ROM/MMT/ reassess      LS                     Pt edu   10'  10' reviewed ext based exercises, HEP, which exercises and how often to complete  20'     Ther Activity                           Gait Training                           Modalities                           HEP:  Access Code: [email or web address]  URL: [email or web address]  Upated: 11/14/2022  Prepared by: Claire Telles    Exercises  · Seated Hamstring Stretch - 2 x daily - 7 x weekly - 1 sets - 3 reps - 20 second hold  · Standing Lumbar Extension with Counter - 2 x daily - 7 x weekly - 1 sets - 10 reps  · Supine Figure 4 Piriformis Stretch - 2 x daily - 7 x weekly - 1 sets - 3 reps - 15 second hold  · Seated Figure 4 Piriformis Stretch - 2 x daily - 7 x weekly - 1 sets - 3 reps - 20 second hold  · Standing Hip Abduction with Counter Support - 5 x weekly - 2 sets - 10 reps  · Standing Hip Extension with Counter Support - 5 x weekly - 2 sets - 10 reps  · Supine Bridge - 7 x weekly - 2 sets - 10 reps - 3 second hold  · Supine Lower Trunk Rotation - 2 x daily - 7 x weekly - 1 sets - 10 reps

## 2022-11-17 ENCOUNTER — OFFICE VISIT (OUTPATIENT)
Dept: PHYSICAL THERAPY | Facility: CLINIC | Age: 82
End: 2022-11-17

## 2022-11-17 DIAGNOSIS — M54.16 LUMBAR RADICULOPATHY: Primary | ICD-10-CM

## 2022-11-17 NOTE — PROGRESS NOTES
Daily Note     Today's date: 2022`  Patient name: Dante Matthew  : 1940  MRN: [id number]  Referring provider: Lakesha Guzman MD  Dx:   Encounter Diagnosis     ICD-10-CM    1  Lumbar radiculopathy  M54 16                      Subjective: Patient reports soreness in lumbar spine, with pt also noting she has been setting her timer for 30 minutes to get up and walk around  Objective: See treatment diary below      Assessment: Tolerated treatment well and with improved symptoms in leg noted following 10 repetitions of repeated extension in standing  Patient demonstrating some gradual improvement in understanding of mechanics of spine issue  Reviewed use of towel roll when sitting, with understanding expressed and demonstrated in session  Will assess again next session for carryover  Patient demonstrated fatigue post treatment, exhibited good technique with therapeutic exercises and would benefit from continued PT      Plan: Continue per plan of care  Progress treatment as tolerated         Precautions: lumbar  Daily Exercise Log:  POC expires at 22  Date 11/17/22   2022 11/10/2022 11/14/22   Visit # 7   4  5 FOTO  6 (re-eval)            Manual         STM to HS insertion                                    Neuro Re-Ed 15'   15'  10'   Sit to stand    Low mat 2x10   2x10 low mat   Sciatic nerve glide 1x15 R/L w/ SOS   15x R/L  15x R/L     bridging 2x10   2x10  2x10  2x10   clamshells YTB 2x10 R/L in S/L   YTB 2x10 R/L      High marching         Lower trunk rotation      2x10                     Ther Ex 23'   25'  28'   LUCILA     1'     w B/L knee flex 2x10     Prone hip ext     1x10 R/L      BRITTANY  1x10 to start, 1x10, 1x10 to end   2x10 to end   1x10 reports inc soreness in lower back, and "numbness in arms"   Piriformis stretch    Supine fig 4 stretch 20"x3 R/L      Std hip abd    YTB 1x10 R/L     Std hip ext    YTB 1x10 R/L     TM walk      5' at 1 0-1 5 mph w/ dec pain   Prone quad stretch Supine over edge 20"x3 R/L     Seated HS stretch    20"x3 R/L  20"x3 R/L      Lower trunk rotation      15x R/L 5" hold each side   ROM/MMT/ reassess      LS                     Pt edu  Extended review of posture, use of towel roll, mechanics of spine, importance of inc frequency of lumbar extension  20'     Ther Activity                           Gait Training                           Modalities                           HEP:  Access Code: [email or web address]  URL: [email or web address]  Upated: 11/14/2022  Prepared by: Gerard Rodriguez    Exercises  · Seated Hamstring Stretch - 2 x daily - 7 x weekly - 1 sets - 3 reps - 20 second hold  · Standing Lumbar Extension with Counter - 2 x daily - 7 x weekly - 1 sets - 10 reps  · Supine Figure 4 Piriformis Stretch - 2 x daily - 7 x weekly - 1 sets - 3 reps - 15 second hold  · Seated Figure 4 Piriformis Stretch - 2 x daily - 7 x weekly - 1 sets - 3 reps - 20 second hold  · Standing Hip Abduction with Counter Support - 5 x weekly - 2 sets - 10 reps  · Standing Hip Extension with Counter Support - 5 x weekly - 2 sets - 10 reps  · Supine Bridge - 7 x weekly - 2 sets - 10 reps - 3 second hold  · Supine Lower Trunk Rotation - 2 x daily - 7 x weekly - 1 sets - 10 reps

## 2022-11-21 ENCOUNTER — OFFICE VISIT (OUTPATIENT)
Dept: PHYSICAL THERAPY | Facility: CLINIC | Age: 82
End: 2022-11-21

## 2022-11-21 DIAGNOSIS — M54.16 LUMBAR RADICULOPATHY: Primary | ICD-10-CM

## 2022-11-21 NOTE — PROGRESS NOTES
Daily Note     Today's date: 2022  Patient name: Parthenia Sacks  : 1940  MRN: [id number]  Referring provider: Jonathan Lora MD  Dx:   Encounter Diagnosis     ICD-10-CM    1  Lumbar radiculopathy  M54 16                      Subjective: pt reports she has been putting some rolled up towels behind her while she is sitting and she had found this helpful  He back feels less stiff than it has, no noted glute pain on arrival        Objective: See treatment diary below      Assessment: Tolerated treatment well  Pt has no noted increases in pain during or post session, cont to enforce compliance with HEP  Cont to progress LE and core strengthening as tolerated  Patient would benefit from continued PT      Plan: Continue per plan of care  Precautions: lumbar  Daily Exercise Log:  POC expires at 22  Date 22       Visit # 7 8                 Manual         STM to HS insertion                                    Neuro Re-Ed 15' 15'       Sit to stand  Low mat 2x10        Sciatic nerve glide 1x15 R/L w/ SOS 1x15 R/L w/ SOS        bridging 2x10 2x10        clamshells YTB 2x10 R/L in S/L YTB 2x10 R/L        High marching         Lower trunk rotation  5"x10 R/L                          Ther Ex 23' 25'        LUCILA          Prone hip ext          BRITTANY  1x10 to start, 1x10, 1x10 to end 2x10 to end        Piriformis stretch  Supine fig 4 stretch 20"x3 R/L         Std hip abd  YTB 2x10 R/L        Std hip ext  YTB 2x10 R/L       TM walk 1 0-1 5mph          supine quad stretch over edge          Seated HS stretch  20"x3 R/L                 ROM/MMT/ reassess         FSU  6" 1x10 R/L        LSU   6" 1x10 R/L       Pt edu  Extended review of posture, use of towel roll, mechanics of spine, importance of inc frequency of lumbar extension          Ther Activity                           Gait Training                           Modalities                           HEP:  Access Code: [email or web address]  URL: [email or web address]  Upated: 11/14/2022  Prepared by: Pancho Gordon    Exercises  · Seated Hamstring Stretch - 2 x daily - 7 x weekly - 1 sets - 3 reps - 20 second hold  · Standing Lumbar Extension with Counter - 2 x daily - 7 x weekly - 1 sets - 10 reps  · Supine Figure 4 Piriformis Stretch - 2 x daily - 7 x weekly - 1 sets - 3 reps - 15 second hold  · Seated Figure 4 Piriformis Stretch - 2 x daily - 7 x weekly - 1 sets - 3 reps - 20 second hold  · Standing Hip Abduction with Counter Support - 5 x weekly - 2 sets - 10 reps  · Standing Hip Extension with Counter Support - 5 x weekly - 2 sets - 10 reps  · Supine Bridge - 7 x weekly - 2 sets - 10 reps - 3 second hold  · Supine Lower Trunk Rotation - 2 x daily - 7 x weekly - 1 sets - 10 reps

## 2022-11-28 ENCOUNTER — OFFICE VISIT (OUTPATIENT)
Dept: PHYSICAL THERAPY | Facility: CLINIC | Age: 82
End: 2022-11-28

## 2022-11-28 DIAGNOSIS — M54.16 LUMBAR RADICULOPATHY: Primary | ICD-10-CM

## 2022-11-28 NOTE — PROGRESS NOTES
Daily Note     Today's date: 2022  Patient name: Amanda Chavira  : 1940  MRN: [id number]  Referring provider: Bettye Saez MD  Dx:   Encounter Diagnosis     ICD-10-CM    1  Lumbar radiculopathy  M54 16           Start Time: 930  Stop Time: 4715  Total time in clinic (min): 45 minutes    Subjective: Patient reports feeling pretty good  Objective: See treatment diary below      Assessment: Tolerated treatment well and without pain reported throughout session  Improving awareness and form noted with standing extension with patient noted today to be holding in extended position, with correction made by therapist and understanding demonstrated by patient  Patient demonstrated fatigue post treatment, exhibited good technique with therapeutic exercises and would benefit from continued PT       Plan: Continue per plan of care  Progress treatment as tolerated         Precautions: lumbar  Daily Exercise Log:  POC expires at 22  Date 22      Visit # 7 8  9               Manual         STM to HS insertion                                    Neuro Re-Ed 15' 15' 15'      Sit to stand  Low mat 2x10  2x10 low mat      Sciatic nerve glide 1x15 R/L w/ SOS 1x15 R/L w/ SOS  1x10 R/L w/ SOS      bridging 2x10 2x10  2x10      clamshells YTB 2x10 R/L in S/L YTB 2x10 R/L  YTB 2x10 R/L       High marching         Lower trunk rotation  5"x10 R/L  5" x10 R/L                        Ther Ex 23' 25'  25'      LUCILA          Prone hip ext          BRITTANY  1x10 to start, 1x10, 1x10 to end 2x10 to end  1x10 reviewed no hold, slow pace, 1x10 to end      Piriformis stretch  Supine fig 4 stretch 20"x3 R/L   Fig 4 stretch supine 20"x3 R/L      Std hip abd  YTB 2x10 R/L  2x10 R/L YTB      Std hip ext  YTB 2x10 R/L 2x10 R/L YTB      TM walk 1 0-1 5mph          supine quad stretch over edge          Seated HS stretch  20"x3 R/L                 ROM/MMT/ reassess         FSU  6" 1x10 R/L  2x10 6" step R/L LSU   6" 1x10 R/L 2x10 6" step R/L      Pt edu  Extended review of posture, use of towel roll, mechanics of spine, importance of inc frequency of lumbar extension  Ther Activity                           Gait Training                           Modalities                           HEP:  Access Code: [email or web address]  URL: [email or web address]  Upated: 11/14/2022  Prepared by: Aviva Habermann    Exercises  · Seated Hamstring Stretch - 2 x daily - 7 x weekly - 1 sets - 3 reps - 20 second hold  · Standing Lumbar Extension with Counter - 2 x daily - 7 x weekly - 1 sets - 10 reps  · Supine Figure 4 Piriformis Stretch - 2 x daily - 7 x weekly - 1 sets - 3 reps - 15 second hold  · Seated Figure 4 Piriformis Stretch - 2 x daily - 7 x weekly - 1 sets - 3 reps - 20 second hold  · Standing Hip Abduction with Counter Support - 5 x weekly - 2 sets - 10 reps  · Standing Hip Extension with Counter Support - 5 x weekly - 2 sets - 10 reps  · Supine Bridge - 7 x weekly - 2 sets - 10 reps - 3 second hold  · Supine Lower Trunk Rotation - 2 x daily - 7 x weekly - 1 sets - 10 reps

## 2022-11-30 ENCOUNTER — OFFICE VISIT (OUTPATIENT)
Dept: PHYSICAL THERAPY | Facility: CLINIC | Age: 82
End: 2022-11-30

## 2022-11-30 DIAGNOSIS — M54.16 LUMBAR RADICULOPATHY: Primary | ICD-10-CM

## 2022-11-30 NOTE — PROGRESS NOTES
Daily Note     Today's date: 2022  Patient name: Lindy Alberts  : 1940  MRN: [id number]  Referring provider: Anna Esteves MD  Dx:   Encounter Diagnosis     ICD-10-CM    1  Lumbar radiculopathy  M54 16           Start Time:   Stop Time:   Total time in clinic (min): 45 minutes    Subjective: Patient reports no pain today on arrival  Has been feeling better lately and feels that she needs to continue to do her exercises  Discussed with patient importance of carrying over exercises, and plan for re-assessment and discharge at next appointment  Objective: See treatment diary below      Assessment: Tolerated treatment well and without complaints other than fatigue  Pt expressing readiness for transition to discharge, set for next session  HEP updated and reviewed with patient  Patient demonstrated fatigue post treatment, exhibited good technique with therapeutic exercises and would benefit from continued PT      Plan: Potential discharge next visit       Precautions: lumbar  Daily Exercise Log:  POC expires at 22  Date 22     Visit # 7 8  9 10 (FOTO)              Manual         STM to HS insertion                                    Neuro Re-Ed 15' 15' 15' 18'     Sit to stand  Low mat 2x10  2x10 low mat 2x10 from chair     Sciatic nerve glide 1x15 R/L w/ SOS 1x15 R/L w/ SOS  1x10 R/L w/ SOS 1x10 R/L w/ SOS     bridging 2x10 2x10  2x10 2x10     clamshells YTB 2x10 R/L in S/L YTB 2x10 R/L  YTB 2x10 R/L  RTB 2x10 R/L     High marching         Lower trunk rotation  5"x10 R/L  5" x10 R/L 10x                       Ther Ex 23' 25'  25' 22'     LUCILA          Prone hip ext          BRITTANY  1x10 to start, 1x10, 1x10 to end 2x10 to end  1x10 reviewed no hold, slow pace, 1x10 to end      Piriformis stretch  Supine fig 4 stretch 20"x3 R/L   Fig 4 stretch supine 20"x3 R/L Fig 4 stretch supine 20"x3 R/L     Std hip abd  YTB 2x10 R/L  2x10 R/L YTB 2x10 R/L RTB      Std hip ext  YTB 2x10 R/L 2x10 R/L YTB 2x10 R/L RTB      TM walk 1 0-1 5mph          supine quad stretch over edge          Seated HS stretch  20"x3 R/L   20"x3 R/L              ROM/MMT/ reassess         FSU  6" 1x10 R/L  2x10 6" step R/L      LSU   6" 1x10 R/L 2x10 6" step R/L      Pt edu  Extended review of posture, use of towel roll, mechanics of spine, importance of inc frequency of lumbar extension  Updated HEP issued and reviewed  Discussed plan for discharge at next session, feeling good  Ther Activity                           Gait Training                           Modalities                           HEP:  Access Code: [id number]  URL: [email or web address]  Updated: 11/30/2022  Prepared by: Blanca Templeton    Exercises  • Seated Hamstring Stretch - 2 x daily - 7 x weekly - 1 sets - 3 reps - 20 second hold  • Standing Lumbar Extension with Counter - 2 x daily - 7 x weekly - 1 sets - 10 reps  • Seated Figure 4 Piriformis Stretch - 2 x daily - 7 x weekly - 1 sets - 3 reps - 20 second hold  • Supine Bridge - 7 x weekly - 2 sets - 10 reps - 3 second hold  • Supine Lower Trunk Rotation - 2 x daily - 7 x weekly - 1 sets - 10 reps  • Hooklying Single Leg Bent Knee Fallouts with Resistance - 4 x weekly - 2 sets - 10 reps  • Supine March with Resistance Band - 4 x weekly - 2 sets - 10 reps  • Standing Hip Abduction with Resistance at Ankles and Counter Support - 3-4 x weekly - 2 sets - 10 reps  • Standing Hip Extension with Resistance at Ankles and Counter Support - 3-4 x weekly - 2 sets - 10 reps  • Sit to Stand - 5-7 x weekly - 2 sets - 10 reps

## 2022-12-05 ENCOUNTER — OFFICE VISIT (OUTPATIENT)
Dept: PHYSICAL THERAPY | Facility: CLINIC | Age: 82
End: 2022-12-05

## 2022-12-05 DIAGNOSIS — M54.16 LUMBAR RADICULOPATHY: Primary | ICD-10-CM

## 2022-12-05 NOTE — PROGRESS NOTES
PT Discharge    Today's date: 2022  Patient name: Melonie Galeazzi  : 1940  MRN: [id number]  Referring provider: Paul Bedolla MD  Dx:   Encounter Diagnosis     ICD-10-CM    1  Lumbar radiculopathy  M54 16           Start Time: 930  Stop Time:   Total time in clinic (min): 45 minutes    Assessment  Assessment details: 22: Patient demonstrating complete abolishment of pain at this time, with patient reporting significantly improved consistency of performing HEP  Has not yet initiated TM walking, although she has a treadmill at home  Trial of TM walking performed in clinic today, to assess patient tolerance, find appropriate speed and time to initiate TM walking  Reviewed multiple times with patient importance of starting, not being concerned with doing 30 min to start  Patient has achieved almost all goals and has demonstrated independence with HEP at this time  Discharge from physical therapy at this time  22: Patient demonstrating abolishment of gluteal pain for >1 week at this time, with continued soreness across lumbar spine after increasing frequency of extension in standing to 5x/day  Patient noted to respond well to adaptations made during today's session  Patient expressing desire to continue therapy with goals of abolishing back pain, however some seems to have been there for an extended period of time per pt report  Limited insight by patient into what makes her feel better and worse, with difficulty pinpointing symptoms now vs  At other times  Patient demonstrating overall improvements, with additional PT indicated to identify consistent HEP that assists with relieving pain and transitioning to HEP with good understanding of when and how to perform  Patient will require additional time due to difficulties with memory   Progress skilled PT to address impairments in strength and mobility, with increasing patient awareness and consistency of performing HEP      10/18/22: Melonie Galeazzi is a 78 y o  female who presents with gluteal pain with pain, decreased strength, and decreased joint mobility  Due to these impairments, patient has difficulty performing recreational activities that involve sitting, due to increased pain  Patient's clinical presentation is consistent with their referring diagnosis of Lumbar radiculopathy  (primary encounter diagnosis) with secondary considerations of piriformis tightness and R LE weakness  Patient has been educated in home exercise program and plan of care as well as getting up more frequently when sitting  Patient would benefit from skilled physical therapy services to address their aforementioned functional limitations and progress towards prior level of function and independence with home exercise program and self symptom management/resolution  Understanding of Dx/Px/POC: good   Prognosis: good    Goals  Short Term Goals: Target Date 11/15/22 (4 weeks)  1  Initiate and advance HEP toward self symptom reduction/resolution  MET  2  Improve postural awareness and demonstrate self postural correction  MET  3  Improve AROM lumbar spine to min baez or better t/o  NOT MET-still stiff in flex/ext  4  Pt to tolerate prolonged sitting/standing x 1 hour or more w/o c/o  MET      Long Term Goals: Target Date 12/13/22 (8 weeks)  1  Indep with HEP and self symptom prevention  MET  2  Achieve lumbar AROM to painfree through available ROM  MET  3  Consistently performing HEP for stretching and strengthening  MET  4  Using treadmill consistently for exercise 3-5x per week  Not achieved, pt has not initiated  Demonstrated ability to complete in clinic today           Plan  Planned therapy interventions: home exercise program  Duration in weeks: 8  Plan of Care beginning date: 10/18/2022  Plan of Care expiration date: 12/13/2022  Treatment plan discussed with: patient        Subjective Evaluation    History of Present Illness  Onset date: Return of L hip/buttock pain ~2 months ago  Mechanism of injury: 22: Patient reports abolishment of buttock/hip pain with return of ability to sit without increased pain  Notes increased lumbar discomfort with increased frequency of performing lumbar extension in standing  Has more vague complaints at this time, with extended time spent attempting to identify what her complaints are specifically  AT IE: Patient reports onset of L buttock/hip pain when sitting do to jigsaw puzzles at home without any noted cause  Notes she has not been continuing with exercises from prior episode of therapy  Patient arrives as a direct access evaluation to outpatient PT  Recurrent probem    Pain  Current pain ratin  At best pain ratin  At worst pain ratin  Quality: sore  Progression: resolved (no longer has buttock/hip pain)    Social Support  Steps to enter house: yes  4  Stairs in house: no   Lives in: [facility]  Lives with: adult children (son)    Employment status: not working    Diagnostic Tests  No diagnostic tests performed  Treatments  Previous treatment: physical therapy  Current treatment: physical therapy        Objective  Lumbar ROM:  Flexion: min-mod limitation  Extension: min-mod limitation  Sideglide L: nil limitation R: nil limitation    ROM:   L   R  Hip flexion [facility]  Hip rotation: Min-mod limitation in left hip IR/ER,  Right Hip IR/ER       MMT:    L   R    Updated 22  Hip flexion(SLR) 5/5   5/5  Hip abduction   5/5   5/5  Hip extension  4+/5   4+/5  Knee flexion (prone)    5/5   5/5  Knee extension 4+/5   4+/5  Ankle dorsiflexion 4+/5   4+/5  Ankle inversion  4+/5   4+/5  Ankle eversion  4+/5   4+/5    Repeated movement testin/5/22: Pain is abolished  Consistently performing repeated extension at counter, with pt noting abolishment of numbness in back and decreased curling of toes reported  22: Pt reporting increased lumbar soreness with repeated extension in standing   Pain in hip is abolished, but notes soreness and discomfort across lower back  Some relief with repeated lower trunk rotation left and right, without specific directional preference noted at this time  At IE:   Extension in standing 1x10, decreased pain from 5 to 2-3/10  Second 10, dec to 2/10 w/ trial of sitting  Piriformis stretch: 20"x3 R/L: dec pain to 1/10     Transfers:   12/5/22 Independent with arising from chair without UE support  Independent with bed mobility and rolling  Gait:   12/5/22: Patient ambulating without significant gait deviations, no pain reported at this time  Good ease of movement into/out of clinic  Able to turn quickly without loss of balance  11/14/22: Ambulating with improved speed, decreased antalgic gait pattern, mild trunk lean and overall increased ease of mobility  10/18/22: Mild antalgic gait with decreased stance time on R LE          Precautions: lumbar  Daily Exercise Log:  POC expires at 12/13/22  Date 11/17/22 11/21/2022 11/28/22 11/30/22 12/5/22    Visit # 7 8  9 10 (FOTO) 11             Manual         STM to HS insertion                                    Neuro Re-Ed 15' 15' 15' 18' 12'    Sit to stand  Low mat 2x10  2x10 low mat 2x10 from chair 2x10 from chair    Sciatic nerve glide 1x15 R/L w/ SOS 1x15 R/L w/ SOS  1x10 R/L w/ SOS 1x10 R/L w/ SOS 15x R/L w/ SOS    bridging 2x10 2x10  2x10 2x10 2x10 5" hold    clamshells YTB 2x10 R/L in S/L YTB 2x10 R/L  YTB 2x10 R/L  RTB 2x10 R/L     High marching         Lower trunk rotation  5"x10 R/L  5" x10 R/L 10x                       Ther Ex 23' 25'  25' 22' 20'    LUCILA          Prone hip ext          BRITTANY  1x10 to start, 1x10, 1x10 to end 2x10 to end  1x10 reviewed no hold, slow pace, 1x10 to end      Piriformis stretch  Supine fig 4 stretch 20"x3 R/L   Fig 4 stretch supine 20"x3 R/L Fig 4 stretch supine 20"x3 R/L 20"x3 R/L fig 4 supine    Std hip abd  YTB 2x10 R/L  2x10 R/L YTB 2x10 R/L RTB      Std hip ext  YTB 2x10 R/L 2x10 R/L YTB 2x10 R/L RTB      TM walk 1 0-1 5mph      1 4 mph x 10 min for assessment of tolerance, speed requirement for carryover at home  supine quad stretch over edge          Seated HS stretch  20"x3 R/L   20"x3 R/L              ROM/MMT/ reassess     LS    FSU  6" 1x10 R/L  2x10 6" step R/L      LSU   6" 1x10 R/L 2x10 6" step R/L      Pt edu  Extended review of posture, use of towel roll, mechanics of spine, importance of inc frequency of lumbar extension  Updated HEP issued and reviewed  Discussed plan for discharge at next session, feeling good  Ther Activity                           Gait Training                           Modalities                           HEP:  Access Code: [email or web address]  URL: [email or web address]  Updated: 11/30/2022  Prepared by: Merlinda Peru    Exercises  • Seated Hamstring Stretch - 2 x daily - 7 x weekly - 1 sets - 3 reps - 20 second hold  • Standing Lumbar Extension with Counter - 2 x daily - 7 x weekly - 1 sets - 10 reps  • Seated Figure 4 Piriformis Stretch - 2 x daily - 7 x weekly - 1 sets - 3 reps - 20 second hold  • Supine Bridge - 7 x weekly - 2 sets - 10 reps - 3 second hold  • Supine Lower Trunk Rotation - 2 x daily - 7 x weekly - 1 sets - 10 reps  • Hooklying Single Leg Bent Knee Fallouts with Resistance - 4 x weekly - 2 sets - 10 reps  • Supine March with Resistance Band - 4 x weekly - 2 sets - 10 reps  • Standing Hip Abduction with Resistance at Ankles and Counter Support - 3-4 x weekly - 2 sets - 10 reps  • Standing Hip Extension with Resistance at Ankles and Counter Support - 3-4 x weekly - 2 sets - 10 reps  • Sit to Stand - 5-7 x weekly - 2 sets - 10 reps

## 2022-12-08 ENCOUNTER — APPOINTMENT (OUTPATIENT)
Dept: PHYSICAL THERAPY | Facility: CLINIC | Age: 82
End: 2022-12-08

## 2022-12-12 ENCOUNTER — APPOINTMENT (OUTPATIENT)
Dept: PHYSICAL THERAPY | Facility: CLINIC | Age: 82
End: 2022-12-12

## 2023-03-31 ENCOUNTER — OFFICE VISIT (OUTPATIENT)
Dept: OBGYN CLINIC | Facility: CLINIC | Age: 83
End: 2023-03-31

## 2023-03-31 VITALS
SYSTOLIC BLOOD PRESSURE: 165 MMHG | WEIGHT: 185 LBS | TEMPERATURE: 97.7 F | HEART RATE: 69 BPM | BODY MASS INDEX: 29.73 KG/M2 | DIASTOLIC BLOOD PRESSURE: 78 MMHG | HEIGHT: 66 IN

## 2023-03-31 DIAGNOSIS — M18.12 ARTHRITIS OF CARPOMETACARPAL (CMC) JOINT OF LEFT THUMB: Primary | ICD-10-CM

## 2023-03-31 RX ORDER — TRIAMCINOLONE ACETONIDE 40 MG/ML
20 INJECTION, SUSPENSION INTRA-ARTICULAR; INTRAMUSCULAR
Status: COMPLETED | OUTPATIENT
Start: 2023-03-31 | End: 2023-03-31

## 2023-03-31 RX ADMIN — TRIAMCINOLONE ACETONIDE 20 MG: 40 INJECTION, SUSPENSION INTRA-ARTICULAR; INTRAMUSCULAR at 12:29

## 2023-03-31 NOTE — PROGRESS NOTES
Assessment/Plan:  1  Arthritis of carpometacarpal (CMC) joint of left thumb  Small joint arthrocentesis: L thumb CMC        Scribe Attestation    I,:  Neftalyalvin Cedillo am acting as a scribe while in the presence of the attending physician :       I,:  Isai Alonso MD personally performed the services described in this documentation    as scribed in my presence :         Small joint arthrocentesis: L thumb CMC  Epworth Protocol:  Consent: Verbal consent obtained  Risks and benefits: risks, benefits and alternatives were discussed  Consent given by: patient  Patient understanding: patient states understanding of the procedure being performed  Patient consent: the patient's understanding of the procedure matches consent given  Required items: required blood products, implants, devices, and special equipment available    Supporting Documentation  Indications: pain   Procedure Details  Location: thumb - L thumb CMC  Needle size: 25 G  Ultrasound guidance: no  Approach: volar  Medications administered: 20 mg triamcinolone acetonide 40 mg/mL    Patient tolerance: patient tolerated the procedure well with no immediate complications  Dressing:  Sterile dressing applied    1 mL Naropin (ropivacaine) 0 5% injection          Upon examination, Merlin Joshi returns for severe carpometacarpal joint osteoarthritis of the thumb  She demonstrates strength in her tendons in the area limited by pain, and decreased pinch strength  I  did discuss risks and benefits of repeat steroid injection today  She was amenable to this and tolerated the injection well with no complications  Post injection instructions were provided  Carpometacarpal arthroplasty was discussed again today, and she is not ready to pursue this at this time  I advised she refrain from performing exercises she showed me from a sheet she had as her pain may be aggravated by these  She may follow up on an as-needed basis  Subjective:   Rogelio Cody is a 78 y  o  female who presents for follow up of left thumb carpometacarpal osteoarthritis  She received a steroid injection to this joint on 10/31/22 and reports 3 months of relief  She would like to repeat this injection today as it has been 5 months since since the last one and that is the length of time she was advised to space these injections out  She reports worsening pain in the thumb and still experiences pain with gripping objects and when she is not conscious of how she is using her hand, such as for cleaning  She is not currently in any physical therapy or taking anything for pain, but states she still has a home exercise plan for previous trigger finger and wants to know if she can start these for the thumb arthritis  She also wants to further discuss a [facility] arthroplasty and the recovery process  Review of Systems   Constitutional: Negative for chills and fever  HENT: Negative for ear pain and sore throat  Eyes: Negative for pain and visual disturbance  Respiratory: Negative for cough and shortness of breath  Cardiovascular: Negative for chest pain and palpitations  Gastrointestinal: Negative for abdominal pain and vomiting  Genitourinary: Negative for dysuria and hematuria  Musculoskeletal: Positive for arthralgias  Negative for back pain  Skin: Negative for color change and rash  Neurological: Negative for seizures and syncope  All other systems reviewed and are negative          Past Medical History:   Diagnosis Date   • Hypertension        Past Surgical History:   Procedure Laterality Date   • BLADDER SURGERY     • CATARACT EXTRACTION Bilateral    • RETINAL DETACHMENT SURGERY Bilateral        Family History   Problem Relation Age of Onset   • No Known Problems Mother    • No Known Problems Father    • No Known Problems Sister    • No Known Problems Brother    • No Known Problems Maternal Aunt    • No Known Problems Maternal Uncle    • No Known Problems Paternal Aunt    • No Known Problems Paternal Uncle    • No Known Problems Maternal Grandmother    • No Known Problems Maternal Grandfather    • No Known Problems Paternal Grandmother    • No Known Problems Paternal Grandfather        Social History     Occupational History   • Not on file   Tobacco Use   • Smoking status: Never   • Smokeless tobacco: Never   Substance and Sexual Activity   • Alcohol use: Never   • Drug use: Never   • Sexual activity: Not on file         Current Outpatient Medications:   •  Cholecalciferol (Vitamin D3) 1 25 MG (06927 UT) TABS, Take by mouth, Disp: , Rfl:   •  losartan-hydrochlorothiazide (HYZAAR) 50-12 5 mg per tablet, , Disp: , Rfl:   •  Diclofenac Sodium (VOLTAREN) 1 %, Apply 2 g topically 4 (four) times a day (Patient not taking: Reported on 5/7/2021), Disp: 1 Tube, Rfl: 0  •  hydrochlorothiazide (MICROZIDE) 12 5 mg capsule, Take 12 5 mg by mouth daily (Patient not taking: Reported on 10/31/2022), Disp: , Rfl:   •  nitrofurantoin (MACRODANTIN) 100 mg capsule, , Disp: , Rfl:     No Known Allergies    Objective:  Vitals:    03/31/23 1153   BP: 165/78   Pulse: 69   Temp: 97 7 °F (36 5 °C)       Left Hand Exam     Tenderness   The patient is experiencing tenderness in the palmar area and radial area  Range of Motion   Wrist   Extension: 50   Flexion: 80     Muscle Strength   Wrist extension: 5/5   Wrist flexion: 5/5     Other   Erythema: absent  Scars: absent  Sensation: normal  Pulse: present    Comments:  Negative CMC grind test  Abductor Pollicis Brevis and Flexor Pollicis Longus limited with pain, 4/5  Pinch strength decreased  Opposition strength 5/5          Strength/Myotome Testing     Left Wrist/Hand   Wrist extension: 5  Wrist flexion: 5      Physical Exam  Vitals reviewed  HENT:      Head: Normocephalic and atraumatic  Eyes:      General:         Right eye: No discharge  Left eye: No discharge  Extraocular Movements: Extraocular movements intact        Conjunctiva/sclera: Conjunctivae normal    Cardiovascular:      Rate and Rhythm: Normal rate  Pulmonary:      Effort: Pulmonary effort is normal  No respiratory distress  Musculoskeletal:         General: Tenderness present  Cervical back: Normal range of motion and neck supple  Comments: As noted in HPI   Skin:     General: Skin is warm and dry  Neurological:      Mental Status: She is alert and oriented to person, place, and time  This document was created using speech voice recognition software  Grammatical errors, random word insertions, pronoun errors, and incomplete sentences are an occasional consequence of this system due to software limitations, ambient noise, and hardware issues  Any formal questions or concerns about content, text, or information contained within the body of this dictation should be directly addressed to the provider for clarification

## 2025-06-09 ENCOUNTER — OFFICE VISIT (OUTPATIENT)
Dept: PHYSICAL THERAPY | Facility: CLINIC | Age: 85
End: 2025-06-09
Payer: MEDICARE

## 2025-06-09 DIAGNOSIS — M54.16 LUMBAR RADICULOPATHY: Primary | ICD-10-CM

## 2025-06-09 PROCEDURE — 97161 PT EVAL LOW COMPLEX 20 MIN: CPT

## 2025-06-09 PROCEDURE — 97110 THERAPEUTIC EXERCISES: CPT

## 2025-06-09 NOTE — LETTER
Maliha 10, 2025    Jose Angel Summers MD  [address]  [address]  [facility] [address]    Patient: Priya Encinas  YOB: 1940   Date of Visit: 2025      Dear Dr. Jose Angel Summers MD  Priya Encinas:    One of your patients, Priya Encinas, presented via Direct Access for an evaluation.  Please review the attached evaluation summary from Priya Encinas's recent visit.  Please verify that you agree with the plan of care by signing the attached document and sending it back to our office.   If you have any questions or concerns, please don't hesitate to call.      Sincerely,    Nyaa Anderson, PT      Primary Care Provider:      I certify that I have read the below Plan of Care and certify the need for these services furnished under this plan of treatment while under my care.                                  Jose Angel Summers MD  [address]  [address]  [facility] [address]  Via Fax: [phone]           PT Evaluation     Today's date: 2025  Patient name: Priya Encinas  : 1940  MRN: [id number]  Referring provider: Jose Angel Summers MD  Dx:   Encounter Diagnosis     ICD-10-CM    1. Lumbar radiculopathy  M54.16                      Assessment  Impairments: abnormal gait, abnormal or restricted ROM, activity intolerance, impaired physical strength, lacks appropriate home exercise program, pain with function, poor posture  and poor body mechanics    Assessment details: Priya Encinas is a 84 y.o. female who presents via direct access with pain, decreased strength, decreased ROM, decreased joint mobility, ambulatory dysfunction, and postural dysfunction. Due to these impairments, patient has difficulty performing ADL's, recreational activities, engaging in social activities, ambulation, transfers. Patient's clinical presentation is consistent with their referring diagnosis of lumbar radiculopathy. Patient demonstrates signs and symptom's consistent with lumbar derangement with extension directional preference with helped to  centralize symptoms. Pt was educated in spine anatomy, concept of peripheralization vs centralization, importance of HEP frequency and use of lumbar roll.  Patient has been educated in home exercise program and plan of care. Patient would benefit from skilled physical therapy services to address their aforementioned functional limitations and progress towards prior level of function and independence with home exercise program. Patient's HEP was modified to include only mechanical correction and plan to add in the additional exercises as her symptoms improve.       Goals  SHORT TERM GOALS: To be met in 4 weeks  1. Pt to demonstrate independence w/ postural awareness and correction.  2. [place] w/ HEP for mechanical correction and preliminary functional activities/mobility  3. Improve lumbar AROM to min baez or better w/o pain.  4. Pt to tolerate prolonged sitting/standing x 30' or more w/o c/o including pain free STS      LONG TERM GOALS: To be met in 12 weeks  1. Pt to be independent in symptom avoidance via continued independence w/ postural awareness and mechanical correction.  2. Lumbar AROM w/o limitation or pain.  3. Pt to be able to tolerate prolonged sitting/standing x1hr w/o pain.        Plan  Patient would benefit from: PT eval and skilled physical therapy  Planned modality interventions: cryotherapy and thermotherapy: hydrocollator packs    Planned therapy interventions: manual therapy, neuromuscular re-education, self care, therapeutic activities, therapeutic exercise and home exercise program    Frequency: 2x week  Plan of Care beginning date: 6/9/2025  Plan of Care expiration date: 9/1/2025  Treatment plan discussed with: patient  Plan details: HEP development, stretching, strengthening, A/AA/PROM, joint mobilizations, posture education, STM/MI as needed to reduce muscle tension, muscle reeducation, PLOC discussed and agreed upon with patient.            Subjective Evaluation    History of Present  "Illness  Mechanism of injury: Patient presents w/ c/c of L groin, calf and low back pain. Notes she had sciatica issues years ago and is consistent w/ her HEP but she has not had pain since she was here last in  until recently. She stopped doing most of her HEP because she is unsure and doesn't want to do the wrong exercises if she has a new issue.   Reports L groin pain that bothers when she picks up her leg as well as calf muscle pain that randomly occurs.  Notes now she is getting sharp pain across her low back when she goes from sitting to standing.   She spends most of her time sitting, participates in myseekit puzzles frequently.   Pain  Current pain ratin  Location: left sided lumb spine, groin, L calf  Quality: dull ache, sharp, tight and discomfort  Aggravating factors: standing and sitting  Progression: improved          Objective    Posture: Lumbar lordosis is WNL in standing. There is nil lateral shift.    In siting, lumbar roll improves comfort.    Lumbar AROM limitations:  (*=  Pain)  Lumbar flexion: min loss  Lumbar extension: mod loss*  R side glide: min loss*  L side glide: WNL    Dermatomal Testing:  L2 anterior thigh: WNL   L3 medial knee: WNL  L4 medial maleolus: WNL  L5 1st web space: WNL  S1 lateral/sole foot: WNL  S2 poster calf:  WNL      Strength (MMT)  Myotomal Testing:     Right  Left  L2-3 Hip flexion: 5/5  4/5*  L3-4 Knee extension: 5/5  4+/5  L5 Great toe exten: NT/5  NT/5  L4 Heel walk/DF: 5/5  4+/5  S1 toe walk/PF/ever: 5/5  5/5  S2 knee flex:  5/5  5/5    Hip abduction  4+/5  4/5  Hip extension  4-/5  3+/5    Core strength: Poor    Special Tests:  SLR supine: (+) L  Crossed SLR: (-)  Prone instability test: NT  Prone hip IR ROM: (+) L  Aberrant motion/[place]'s: (-)  Supine to sit test: NT  Slump test: NT  Femoral NTT: (+) L    Mechanical Asessment: pre-test symtpoms include 2/10 calf pain   LUCILA: 30\"x3 Dec/B - abolished calf centralized to low back (3/10)   Repeated Extension in " Standing (BRITTANY): Dec/B - abolish calf centralized to low back (3/10)    Joint mobility & Palpation:   Hypomobility w/ PA mobs lower lumb w/ mild pain noted L      Flexibility:   + Tightness L hip flexor/quad  + Tightness L hip ER/IR     Function:  Standing heather 30-40 min  Sitting heather : no baez but pain w/ STS transfer after brief episodes of sitting  Sleep baez N but unable to sleep on L side (states this has been her PLOF since 2022)           Precautions: Past Medical History[1]  SOC: 6/9/25   FOTO: 6/9/25   POC Expiration: 9/1/25  Daily Treatment Log:  Date 6/9/25       Visit # 1       Manual                        Ther Exer        LTR        Fig 4 str         Hip flexor str off edge                                         HEP        Ther Activ                                                        NMReed        BRITTANY        LUCILA         LUCILA w/ bilat knee flex                         Modalities         5' post session                          Access Code: [email or web address]  URL: [email or web address]  Date: 06/09/2025  Prepared by: Naya Nova    Exercises  - Standing Lumbar Extension with Counter  - 4-6 x daily - 7 x weekly - 1-2 sets - 10 reps  - Static Prone on Elbows  - 2 x daily - 7 x weekly - 3 reps - 30 hold  - Supine Lower Trunk Rotation  - 1 x daily - 7 x weekly - 1 sets - 10 reps - 10 hold                      [1]  Past Medical History:  Diagnosis Date   • Hypertension

## 2025-06-09 NOTE — PROGRESS NOTES
PT Evaluation     Today's date: 2025  Patient name: Priya Encinas  : 1940  MRN: [id number]  Referring provider: Jose Angel Summers MD  Dx:   Encounter Diagnosis     ICD-10-CM    1. Lumbar radiculopathy  M54.16                      Assessment  Impairments: abnormal gait, abnormal or restricted ROM, activity intolerance, impaired physical strength, lacks appropriate home exercise program, pain with function, poor posture  and poor body mechanics    Assessment details: Priya Encinas is a 84 y.o. female who presents via direct access with pain, decreased strength, decreased ROM, decreased joint mobility, ambulatory dysfunction, and postural dysfunction. Due to these impairments, patient has difficulty performing ADL's, recreational activities, engaging in social activities, ambulation, transfers. Patient's clinical presentation is consistent with their referring diagnosis of lumbar radiculopathy. Patient demonstrates signs and symptom's consistent with lumbar derangement with extension directional preference with helped to centralize symptoms. Pt was educated in spine anatomy, concept of peripheralization vs centralization, importance of HEP frequency and use of lumbar roll.  Patient has been educated in home exercise program and plan of care. Patient would benefit from skilled physical therapy services to address their aforementioned functional limitations and progress towards prior level of function and independence with home exercise program. Patient's HEP was modified to include only mechanical correction and plan to add in the additional exercises as her symptoms improve.       Goals  SHORT TERM GOALS: To be met in 4 weeks  1. Pt to demonstrate independence w/ postural awareness and correction.  2. [place] w/ HEP for mechanical correction and preliminary functional activities/mobility  3. Improve lumbar AROM to min baez or better w/o pain.  4. Pt to tolerate prolonged sitting/standing x 30' or more w/o c/o  including pain free STS      LONG TERM GOALS: To be met in 12 weeks  1. Pt to be independent in symptom avoidance via continued independence w/ postural awareness and mechanical correction.  2. Lumbar AROM w/o limitation or pain.  3. Pt to be able to tolerate prolonged sitting/standing x1hr w/o pain.        Plan  Patient would benefit from: PT eval and skilled physical therapy  Planned modality interventions: cryotherapy and thermotherapy: hydrocollator packs    Planned therapy interventions: manual therapy, neuromuscular re-education, self care, therapeutic activities, therapeutic exercise and home exercise program    Frequency: 2x week  Plan of Care beginning date: 2025  Plan of Care expiration date: 2025  Treatment plan discussed with: patient  Plan details: HEP development, stretching, strengthening, A/AA/PROM, joint mobilizations, posture education, STM/MI as needed to reduce muscle tension, muscle reeducation, PLOC discussed and agreed upon with patient.            Subjective Evaluation    History of Present Illness  Mechanism of injury: Patient presents w/ c/c of L groin, calf and low back pain. Notes she had sciatica issues years ago and is consistent w/ her HEP but she has not had pain since she was here last in  until recently. She stopped doing most of her HEP because she is unsure and doesn't want to do the wrong exercises if she has a new issue.   Reports L groin pain that bothers when she picks up her leg as well as calf muscle pain that randomly occurs.  Notes now she is getting sharp pain across her low back when she goes from sitting to standing.   She spends most of her time sitting, participates in jigsaw puzzles frequently.   Pain  Current pain ratin  Location: left sided lumb spine, groin, L calf  Quality: dull ache, sharp, tight and discomfort  Aggravating factors: standing and sitting  Progression: improved          Objective    Posture: Lumbar lordosis is WNL in standing.  "There is nil lateral shift.    In siting, lumbar roll improves comfort.    Lumbar AROM limitations:  (*=  Pain)  Lumbar flexion: min loss  Lumbar extension: mod loss*  R side glide: min loss*  L side glide: WNL    Dermatomal Testing:  L2 anterior thigh: WNL   L3 medial knee: WNL  L4 medial maleolus: WNL  L5 1st web space: WNL  S1 lateral/sole foot: WNL  S2 poster calf:  WNL      Strength (MMT)  Myotomal Testing:     Right  Left  L2-3 Hip flexion: 5/5  4/5*  L3-4 Knee extension: 5/5  4+/5  L5 Great toe exten: NT/5  NT/5  L4 Heel walk/DF: 5/5  4+/5  S1 toe walk/PF/ever: 5/5  5/5  S2 knee flex:  5/5  5/5    Hip abduction  4+/5  4/5  Hip extension  4-/5  3+/5    Core strength: Poor    Special Tests:  SLR supine: (+) L  Crossed SLR: (-)  Prone instability test: NT  Prone hip IR ROM: (+) L  Aberrant motion/Julio's: (-)  Supine to sit test: NT  Slump test: NT  Femoral NTT: (+) L    Mechanical Asessment: pre-test symtpoms include 2/10 calf pain   LUCILA: 30\"x3 Dec/B - abolished calf centralized to low back (3/10)   Repeated Extension in Standing (BRITTANY): Dec/B - abolish calf centralized to low back (3/10)    Joint mobility & Palpation:   Hypomobility w/ PA mobs lower lumb w/ mild pain noted L      Flexibility:   + Tightness L hip flexor/quad  + Tightness L hip ER/IR     Function:  Standing heather 30-40 min  Sitting heather : no baez but pain w/ STS transfer after brief episodes of sitting  Sleep baez N but unable to sleep on L side (states this has been her PLOF since 2022)           Precautions: Past Medical History[1]  SOC: 6/9/25   FOTO: 6/9/25   POC Expiration: 9/1/25  Daily Treatment Log:  Date 6/9/25       Visit # 1       Manual                        Ther Exer        LTR        Fig 4 str         Hip flexor str off edge                                         HEP        Ther Activ                                                        NMReed        BRITTANY        LUCILA         LUCILA w/ bilat knee flex                         Modalities   "       5' post session                          Access Code: [email or web address]  URL: [email or web address]  Date: 06/09/2025  Prepared by: Naya Nova    Exercises  - Standing Lumbar Extension with Counter  - 4-6 x daily - 7 x weekly - 1-2 sets - 10 reps  - Static Prone on Elbows  - 2 x daily - 7 x weekly - 3 reps - 30 hold  - Supine Lower Trunk Rotation  - 1 x daily - 7 x weekly - 1 sets - 10 reps - 10 hold              [1]   Past Medical History:  Diagnosis Date    Hypertension

## 2025-06-18 ENCOUNTER — OFFICE VISIT (OUTPATIENT)
Dept: PHYSICAL THERAPY | Facility: CLINIC | Age: 85
End: 2025-06-18
Payer: MEDICARE

## 2025-06-18 DIAGNOSIS — M54.16 LUMBAR RADICULOPATHY: Primary | ICD-10-CM

## 2025-06-18 PROCEDURE — 97112 NEUROMUSCULAR REEDUCATION: CPT

## 2025-06-18 PROCEDURE — 97110 THERAPEUTIC EXERCISES: CPT

## 2025-06-18 NOTE — PROGRESS NOTES
"Daily Note     Today's date: 2025  Patient name: Priya Encinas  : 1940  MRN: [id number]  Referring provider: Jose nAgel Summers MD  Dx:   Encounter Diagnosis     ICD-10-CM    1. Lumbar radiculopathy  M54.16           Start Time: 1315  Stop Time: 1400  Total time in clinic (min): 45 minutes    Subjective: Pt reports she is overall better since initial evaluation.       Objective: See treatment diary below      Assessment: Pt reports no radicular pain throughout treatment session, demonstrates performing HEP correctly. Pt instructed to continue HEP and not to add any. Pt also re-educated about breaking up sitting when performing puzzles.     Plan: Potential discharge next visit. Progress treatment as tolerated.       Precautions: Past Medical History[1]  SOC: 25   FOTO: 25   POC Expiration: 25  Daily Treatment Log:  Date 25      Visit # 1 2      Manual                        Ther Exer  15'      LTR  1x10      Fig 4 str   X2 30\"      Hip flexor str off edge   X2 30\"                                      HEP        Ther Activ                                                        NMReed  25'      BRITTANY  x10      LUCILA   30\" x 3      LUCILA w/ bilat knee flex         TVA w/ march  X10       TVA  X10 5\"      TVA w/ iso hip add  X10 5\"      TVA w/ ER  X10 B      Palloff Press  Kieser 4.0 5x B      Modalities        MH 5' post session                          Access Code: [email or web address]  URL: [email or web address]  Date: 2025  Prepared by: Naya Nova    Exercises  - Standing Lumbar Extension with Counter  - 4-6 x daily - 7 x weekly - 1-2 sets - 10 reps  - Static Prone on Elbows  - 2 x daily - 7 x weekly - 3 reps - 30 hold  - Supine Lower Trunk Rotation  - 1 x daily - 7 x weekly - 1 sets - 10 reps - 10 hold                 [1]   Past Medical History:  Diagnosis Date    Hypertension      "

## 2025-06-25 ENCOUNTER — OFFICE VISIT (OUTPATIENT)
Dept: PHYSICAL THERAPY | Facility: CLINIC | Age: 85
End: 2025-06-25
Attending: FAMILY MEDICINE
Payer: MEDICARE

## 2025-06-25 DIAGNOSIS — M54.16 LUMBAR RADICULOPATHY: Primary | ICD-10-CM

## 2025-06-25 PROCEDURE — 97112 NEUROMUSCULAR REEDUCATION: CPT

## 2025-06-25 PROCEDURE — 97110 THERAPEUTIC EXERCISES: CPT

## 2025-06-25 NOTE — PROGRESS NOTES
"Daily Note     Today's date: 2025  Patient name: Priya Encinas  : 1940  MRN: [id number]  Referring provider: Jose Angel Summers MD  Dx:   Encounter Diagnosis     ICD-10-CM    1. Lumbar radiculopathy  M54.16                      Subjective: Patient notes she is feeling much better overall. She is hesitant to be completely done as she is fearful pain will return.       Objective: See treatment diary below      Assessment: Tolerated treatment well and without production of pain. As patient has been symptom free for over 3 days, frequency of BRITTANY was reduced to 2x/daily. Also discussed increasing frequency back to 5-6x/day if her pain returns. Patient demonstrated fatigue post treatment, exhibited good technique with therapeutic exercises, and would benefit from continued PT.       Plan: Continue per plan of care.  Potential discharge next visit. Pending patients tolerance to progressed HEP.      Precautions: Past Medical History[1]  SOC: 25   FOTO: 25   POC Expiration: 25  Daily Treatment Log:  Date 25      Visit # 1 2 3     Manual                        Ther Exer  15' 15'     LTR  1x10 5\"x10     Fig 4 str   X2 30\" 20\"x3 R/L     Hip flexor str off edge   X2 30\" 20\"x3 R/L                                     HEP   Updated & reviewed      Ther Activ                                                        NMReed  25' 25'     BRITTANY  x10 1x10     LUCILA   30\" x 3 30\"x3      LUCILA w/ bilat knee flex    1x10      TVA w/ march  X10  1x10     TVA  X10 5\" 5\"x10      TVA w/ iso hip add  X10 5\" 5\"x10      TVA w/ ER  X10 B      Palloff Press  Kieser 4.0 5x B 5# 1x10 R/L     Modalities        MH 5' post session                            Access Code: [email or web address]  URL: [email or web address]  Date: 2025  Prepared by: Naya Nova    Exercises  - Standing Lumbar Extension with Counter  - 1-2 x daily - 7 x weekly - 1-2 sets - 10 reps  - Static Prone on Elbows  - 1 x daily - 7 x weekly - 3 reps - " 30 hold  - Supine Lower Trunk Rotation  - 1 x daily - 7 x weekly - 1 sets - 10 reps - 10 hold  - Supine Figure 4 Piriformis Stretch  - 1 x daily - 7 x weekly - 3 reps - 20 hold  - Supine Transversus Abdominis Bracing - Hands on Stomach  - 1 x daily - 7 x weekly - 2 sets - 10 reps  - Standing Hip Abduction with Counter Support  - 1 x daily - 7 x weekly - 2 sets - 10 reps                [1]   Past Medical History:  Diagnosis Date    Hypertension

## 2025-07-02 ENCOUNTER — OFFICE VISIT (OUTPATIENT)
Dept: PHYSICAL THERAPY | Facility: CLINIC | Age: 85
End: 2025-07-02
Payer: MEDICARE

## 2025-07-02 DIAGNOSIS — M54.16 LUMBAR RADICULOPATHY: Primary | ICD-10-CM

## 2025-07-02 PROCEDURE — 97110 THERAPEUTIC EXERCISES: CPT

## 2025-07-02 PROCEDURE — 97112 NEUROMUSCULAR REEDUCATION: CPT

## 2025-07-02 NOTE — PROGRESS NOTES
Daily Note     Today's date: 2025  Patient name: Priya Encinas  : 1940  MRN: [id number]  Referring provider: Jose Angel Summers MD  Dx:   Encounter Diagnosis     ICD-10-CM    1. Lumbar radiculopathy  M54.16                      Subjective: Patient notes she is feeling pretty good, has had no real pain to mention over the last several weeks. Patient mentions L medial calf tenderness w/ area of redness noted she has brought this up to her PCP.      Objective: See treatment diary below  (-) [place]   (+) small area of redness w/ slightly hardened skin     Clinical Characteristic Score Score   Active cancer (patient receiving treatment for cancer within the previous 6 mo or currently receiving palliative treatment): 0   Paralysis, paresis, or recent plaster  immobilization of the lower extremities 0   Recently bedridden for 3 days or more, or major surgery within the previous 12 wk requiring general or regional anesthesia 0   Localized tenderness along the distribution of the  deep venous system +1   Entire leg swollen 0   Calf swelling at least 3 cm larger than that on the  asymptomatic side (measured 10 cm below  tibial tuberosity) 0   Pitting edema confined to the symptomatic leg +1  (bilat but worse on L)   Previously documented deep-vein thrombosis 0   Alternative diagnosis at least as likely as deepvein thrombosis -2    0   A score of < 2 is considered low probability for DVT      Assessment:   SHORT TERM GOALS: To be met in 4 weeks (met)  1. Pt to demonstrate independence w/ postural awareness and correction.  2. [place] w/ HEP for mechanical correction and preliminary functional activities/mobility  3. Improve lumbar AROM to min baez or better w/o pain.  4. Pt to tolerate prolonged sitting/standing x 30' or more w/o c/o including pain free STS      LONG TERM GOALS: To be met in 12 weeks (met)  1. Pt to be independent in symptom avoidance via continued independence w/ postural awareness and mechanical  "correction.  2. Lumbar AROM w/o limitation or pain.  3. Pt to be able to tolerate prolonged sitting/standing x1hr w/o pain.    Tolerated treatment well and without pain. Patient has returned to PLOF and has met functional goals at this time and she will be discharged today. Did discuss her calf discomfort with Wells criteria fairly low likelihood of DVT noted; as she has also spoken to her PCP about this recently. Patient was encouraged to monitor this and follow up w/ them if anything worsens.        Plan: discharge to [facility] w/ follow up call in 2 weeks to check in.         Precautions: Past Medical History[1]  SOC: 6/9/25   FOTO: 7/2/25  POC Expiration: 9/1/25  Daily Treatment Log:  Date 6/9/25 6/18 6/25/25 7/2/25     Visit # 1 2 3 4 DC/FOTO    Manual                        Ther Exer  15' 15' 25'    LTR  1x10 5\"x10 10\"x10    Fig 4 str   X2 30\" 20\"x3 R/L 20\"x3 R/L     Hip flexor str off edge   X2 30\" 20\"x3 R/L     Std hip abd     GTB @ thighs 2x10 R/L                             HEP   Updated & reviewed  Updated & reviewed     Ther Activ                                                        NMReed  25' 25' 13'    BRITTANY  x10 1x10 1x10    LUCILA   30\" x 3 30\"x3      LUCILA w/ bilat knee flex    1x10      TVA w/ march  X10  1x10     TVA  X10 5\" 5\"x10  5\"x10     TVA w/ iso hip add  X10 5\" 5\"x10      TVA w/ ER  X10 B      Palloff Press  Kieser 4.0 5x B 5# 1x10 R/L     Modalities         5' post session                             Access Code: [email or web address]  URL: [email or web address]  Date: 07/02/2025  Prepared by: Naya Nova    Exercises  - Standing Lumbar Extension with Counter  - 1-2 x daily - 7 x weekly - 1-2 sets - 10 reps  - Static Prone on Elbows  - 1 x daily - 4 x weekly - 3 reps - 30 hold  - Supine Lower Trunk Rotation  - 1 x daily - 7 x weekly - 1 sets - 10 reps - 10 hold  - Supine Figure 4 Piriformis Stretch  - 1 x daily - 4 x weekly - 3 reps - 20 hold  - Supine Transversus Abdominis Bracing - Hands on " Stomach  - 1 x daily - 4 x weekly - 2 sets - 10 reps - 5 hold  - Standing Hip Abduction with Counter Support  - 1 x daily - 4 x weekly - 2 sets - 10 reps            [1]   Past Medical History:  Diagnosis Date    Hypertension

## 2025-07-07 ENCOUNTER — APPOINTMENT (OUTPATIENT)
Dept: PHYSICAL THERAPY | Facility: CLINIC | Age: 85
End: 2025-07-07
Payer: MEDICARE

## 2025-07-09 ENCOUNTER — APPOINTMENT (OUTPATIENT)
Dept: PHYSICAL THERAPY | Facility: CLINIC | Age: 85
End: 2025-07-09
Attending: FAMILY MEDICINE
Payer: MEDICARE

## 2025-07-14 ENCOUNTER — APPOINTMENT (OUTPATIENT)
Dept: PHYSICAL THERAPY | Facility: CLINIC | Age: 85
End: 2025-07-14
Attending: FAMILY MEDICINE
Payer: MEDICARE

## 2025-07-16 ENCOUNTER — APPOINTMENT (OUTPATIENT)
Dept: PHYSICAL THERAPY | Facility: CLINIC | Age: 85
End: 2025-07-16
Payer: MEDICARE

## 2025-07-21 ENCOUNTER — APPOINTMENT (OUTPATIENT)
Dept: PHYSICAL THERAPY | Facility: CLINIC | Age: 85
End: 2025-07-21
Attending: FAMILY MEDICINE
Payer: MEDICARE

## 2025-07-23 ENCOUNTER — APPOINTMENT (OUTPATIENT)
Dept: PHYSICAL THERAPY | Facility: CLINIC | Age: 85
End: 2025-07-23
Attending: FAMILY MEDICINE
Payer: MEDICARE